# Patient Record
Sex: FEMALE
[De-identification: names, ages, dates, MRNs, and addresses within clinical notes are randomized per-mention and may not be internally consistent; named-entity substitution may affect disease eponyms.]

---

## 2017-04-13 ENCOUNTER — HOSPITAL ENCOUNTER (EMERGENCY)
Dept: HOSPITAL 42 - ED | Age: 62
Discharge: HOME | End: 2017-04-13
Payer: SELF-PAY

## 2017-04-13 VITALS — OXYGEN SATURATION: 95 % | DIASTOLIC BLOOD PRESSURE: 76 MMHG | HEART RATE: 95 BPM | SYSTOLIC BLOOD PRESSURE: 121 MMHG

## 2017-04-13 VITALS — TEMPERATURE: 97.7 F | RESPIRATION RATE: 18 BRPM

## 2017-04-13 VITALS — BODY MASS INDEX: 24.9 KG/M2

## 2017-04-13 DIAGNOSIS — F32.9: Primary | ICD-10-CM

## 2017-04-13 LAB
ADD MANUAL DIFF?: NO
ALBUMIN/GLOB SERPL: 1.1 {RATIO} (ref 1.1–1.8)
ALP SERPL-CCNC: 101 U/L (ref 38–133)
ALT SERPL-CCNC: 49 U/L (ref 7–56)
APPEARANCE UR: CLEAR
AST SERPL-CCNC: 59 U/L (ref 15–39)
BASOPHILS # BLD AUTO: 0.06 K/MM3 (ref 0–2)
BASOPHILS NFR BLD: 0.9 % (ref 0–3)
BILIRUB SERPL-MCNC: 1.7 MG/DL (ref 0.2–1.3)
BILIRUB UR-MCNC: NEGATIVE MG/DL
BUN SERPL-MCNC: 11 MG/DL (ref 7–21)
CALCIUM SERPL-MCNC: 8.8 MG/DL (ref 8.4–10.5)
CHLORIDE SERPL-SCNC: 102 MMOL/L (ref 95–110)
CO2 SERPL-SCNC: 31 MMOL/L (ref 21–33)
COLOR UR: YELLOW
EOSINOPHIL # BLD: 0.3 10*3/UL (ref 0–0.7)
EOSINOPHIL NFR BLD: 4.4 % (ref 1.5–5)
ERYTHROCYTE [DISTWIDTH] IN BLOOD BY AUTOMATED COUNT: 13.6 % (ref 11.5–14.5)
GLOBULIN SER-MCNC: 3.4 GM/DL
GLUCOSE SERPL-MCNC: 82 MG/DL (ref 70–110)
GLUCOSE UR STRIP-MCNC: NEGATIVE MG/DL
GRANULOCYTES # BLD: 3.09 10*3/UL (ref 1.4–6.5)
GRANULOCYTES NFR BLD: 48.6 % (ref 50–68)
HCT VFR BLD CALC: 39.8 % (ref 36–48)
KETONES UR STRIP-MCNC: NEGATIVE MG/DL
LEUKOCYTE ESTERASE UR-ACNC: NEGATIVE LEU/UL
LYMPHOCYTES # BLD: 2.4 10*3/UL (ref 1.2–3.4)
LYMPHOCYTES NFR BLD AUTO: 37 % (ref 22–35)
MCH RBC QN AUTO: 33.2 PG (ref 25–35)
MCHC RBC AUTO-ENTMCNC: 35.4 G/DL (ref 31–37)
MCV RBC AUTO: 93.6 FL (ref 80–105)
MONOCYTES # BLD AUTO: 0.6 10*3/UL (ref 0.1–0.6)
MONOCYTES NFR BLD: 9.1 % (ref 1–6)
PH UR STRIP: 7 [PH] (ref 4.7–8)
PLATELET # BLD: 160 10^3/UL (ref 120–450)
PMV BLD AUTO: 10.3 FL (ref 7–11)
POTASSIUM SERPL-SCNC: 3.9 MMOL/L (ref 3.6–5)
PROT SERPL-MCNC: 7.2 G/DL (ref 5.8–8.3)
PROT UR STRIP-MCNC: NEGATIVE MG/DL
RBC # UR STRIP: NEGATIVE /UL
SODIUM SERPL-SCNC: 139 MMOL/L (ref 132–148)
SP GR UR STRIP: 1.01 (ref 1–1.03)
UROBILINOGEN UR STRIP-ACNC: 4 E.U./DL
WBC # BLD AUTO: 6.4 10^3/UL (ref 4.5–11)

## 2017-04-13 PROCEDURE — 99284 EMERGENCY DEPT VISIT MOD MDM: CPT

## 2017-04-13 PROCEDURE — 85025 COMPLETE CBC W/AUTO DIFF WBC: CPT

## 2017-04-13 PROCEDURE — 71010: CPT

## 2017-04-13 PROCEDURE — 81003 URINALYSIS AUTO W/O SCOPE: CPT

## 2017-04-13 PROCEDURE — 80053 COMPREHEN METABOLIC PANEL: CPT

## 2017-04-13 PROCEDURE — 93005 ELECTROCARDIOGRAM TRACING: CPT

## 2017-04-13 PROCEDURE — 82948 REAGENT STRIP/BLOOD GLUCOSE: CPT

## 2017-04-13 NOTE — ED PDOC
Arrival/HPI





- General


Chief Complaint: Psychiatric Evaluation


Time Seen by Provider: 04/13/17 14:40


Historian: Patient, Spouse





- History of Present Illness


Narrative History of Present Illness (Text): 


04/13/17 15:21


61 year old female presents to the emergency department after telling her 

 she wants to hurt herself and him.  states she is on Xanax and 

Oxycodone. 


Time/Duration: 24 hours


Modifying Factors (Text): 


None 


Context: Home


Associated Symptoms (Text): 


None 





Past Medical History





- Provider Review


Nursing Documentation Reviewed: Yes





- Infectious Disease


Hx of Infectious Diseases: None





- Tetanus Immunization


Tetanus Immunization: Unknown





- Past Medical History


Past Medical History: Unable to Obtain





- Cardiac


Hx Cardiac Disorders: No


Hx Hypertension: No





- Pulmonary


Hx Tuberculosis: No





- Neurological


HX Cerebrovascular Accident: No


Hx Seizures: No





- HEENT


Hx HEENT Disorder: No





- Renal


Hx Renal Disorder: No





- Endocrine/Metabolic


Hx Endocrine Disorders: No





- Hematological/Oncological


Hx Blood Disorders: No


Hx Cancer: No





- Integumentary


Hx Dermatological Disorder: No





- Musculoskeletal/Rheumatological


Hx Musculoskeletal Disorders: Yes


Hx Back Pain: Yes


Hx Falls: Yes (When pt took too much drugs as per pt.)


Other/Comment: Sciatica





- Gastrointestinal


Hx Gastrointestinal Disorders: No





- Genitourinary/Gynecological


Hx Genitourinary Disorders: No


Hx Sexually Transmitted Diseases: No





- Psychiatric


Hx Psychophysiologic Disorder: No


Hx Substance Use: No (denies)





- Past Surgical History


Past Surgical History: Unable to Obtain





- Anesthesia


Hx Anesthesia: No





- Suicidal Assessment


Feels Threatened In Home Enviroment: No





Family/Social History





- Physician Review


Nursing Documentation Reviewed: Yes


Family/Social History: Unknown Family HX


Smoking Status: Heavy Smoker > 10 Cigarettes Daily


Hx Alcohol Use: No (denies)


Hx Substance Use: No (denies)





Allergies/Home Meds


Allergies/Adverse Reactions: 


Allergies





Penicillins Allergy (Verified 04/13/17 14:57)


 ANAPHYLAXIS








Home Medications: 


 Home Meds











 Medication  Instructions  Recorded  Confirmed


 


Alprazolam [Xanax] 2 mg PO PRN PRN 11/09/16 11/09/16


 


Oxycodone HCl [Oxycodone HCl ER] 30 mg PO Q12 11/09/16 11/09/16


 


Oxycodone HCl [Oxycontin] 30 mg PO PRN PRN 11/09/16 11/09/16














Review of Systems





- Physician Review


All systems were reviewed & negative as marked: Yes





- Review of Systems


Cardiovascular: absent: Chest Pain


Gastrointestinal: absent: Abdominal Pain





Physical Exam


Vital Signs Reviewed: Yes


Vital Signs











  Temp Pulse Resp BP Pulse Ox


 


 04/13/17 19:27   95 H  18  121/76  95


 


 04/13/17 17:56   65  18  105/63  93 L


 


 04/13/17 16:27   67  18  105/63  92 L


 


 04/13/17 14:53  97.7 F  73  18  126/70  98











Temperature: Afebrile


Blood Pressure: Normal


Pulse: Regular


Respiratory Rate: Normal


Appearance: Positive for: Well-Appearing, Non-Toxic, Comfortable


Pain Distress: None


Mental Status: Positive for: other (Awake, alert)


Finger Stick Blood Glucose: 73





- Systems Exam


Head: Present: Atraumatic, Normocephalic


Pupils: Present: PERRL


Extroacular Muscles: Present: EOMI


Conjunctiva: Present: Normal


Mouth: Present: Moist Mucous Membranes


Neck: Present: Normal Range of Motion


Respiratory/Chest: Present: Clear to Auscultation, Good Air Exchange.  No: 

Respiratory Distress, Accessory Muscle Use


Cardiovascular: Present: Regular Rate and Rhythm, Normal S1, S2.  No: Murmurs


Abdomen: Present: Normal Bowel Sounds.  No: Tenderness, Distention, Peritoneal 

Signs


Back: Present: Normal Inspection


Upper Extremity: Present: Normal Inspection.  No: Cyanosis, Edema


Lower Extremity: Present: Normal Inspection.  No: Edema


Neurological: Present: GCS=15, CN II-XII Intact, Speech Normal


Skin: Present: Warm, Dry, Normal Color.  No: Rashes


Psychiatric: Present: Alert, Oriented x 3, Normal Insight, Normal Concentration





Medical Decision Making


ED Course and Treatment: 


Impression: 61 year old female presents to the emergency department after 

telling her  she wants to hurt herself and him.





Differential Diagnosis include but are not limited to:  





Plan:


-- Chest X-ray


-- Labs


-- Reassess and disposition





Progress Notes: 





Patient medically clear, pending callback from collateral as per PES





04/13/17 21:44


cleared by crisis. pt demanding immediate dc





04/13/17 21:45


notifeid of mildly increased lfts, no abd ttp, advse outpt f/u pt verbalized 

understanding. 





- Lab Interpretations


Lab Results: 








 04/13/17 14:58 





 04/13/17 14:58 





 Lab Results





04/13/17 16:50: Urine Color Yellow, Urine Appearance Clear, Urine pH 7.0, Ur 

Specific Gravity 1.010, Urine Protein Negative, Urine Glucose (UA) Negative, 

Urine Ketones Negative, Urine Blood Negative, Urine Nitrate Negative, Urine 

Bilirubin Negative, Urine Urobilinogen 4.0 H, Ur Leukocyte Esterase Negative, 

Urine Opiates Screen Positive H, Urine Methadone Screen Negative, Ur 

Barbiturates Screen Negative, Ur Phencyclidine Scrn Negative, Ur Amphetamines 

Screen Negative, U Benzodiazepines Scrn Positive H, U Oth Cocaine Metabols 

Positive H, U Cannabinoids Screen Negative


04/13/17 15:05: Salicylates < 1 L


04/13/17 14:58: WBC 6.4, RBC 4.25, Hgb 14.1, Hct 39.8, MCV 93.6, MCH 33.2, MCHC 

35.4, RDW 13.6, Plt Count 160, MPV 10.3, Gran % 48.6 L, Lymph % (Auto) 37.0 H, 

Mono % (Auto) 9.1 H, Eos % (Auto) 4.4, Baso % (Auto) 0.9, Gran # 3.09, Lymph # 

2.4, Mono # 0.6, Eos # 0.3, Baso # 0.06, Sodium 139, Potassium 3.9, Chloride 102

, Carbon Dioxide 31, Anion Gap 10, BUN 11, Creatinine 0.7, Est GFR (African Amer

) > 60, Est GFR (Non-Af Amer) > 60, Random Glucose 82, Calcium 8.8, Total 

Bilirubin 1.7 H, AST 59 H, ALT 49, Alkaline Phosphatase 101, Total Protein 7.2, 

Albumin 3.7, Globulin 3.4, Albumin/Globulin Ratio 1.1, Acetaminophen < 10.0 L, 

Alcohol, Quantitative < 10











- RAD Interpretation


Radiology Orders: 








04/13/17 15:19


CXR [CHEST PORTABLE] [RAD] Stat 














- EKG Interpretation


EKG Interpretation (Text): 


EKG shows NSR at 70 BPM, no ST/T wave changes


Interpreted by ED Physician: Yes


Type: 12 lead EKG





- Scribe Statement


The provider has reviewed the documentation as recorded by the Gary Gant





Provider Scribe Attestation:


All medical record entries made by the Scribe were at my direction and 

personally dictated by me. I have reviewed the chart and agree that the record 

accurately reflects my personal performance of the history, physical exam, 

medical decision making, and the department course for this patient. I have 

also personally directed, reviewed, and agree with the discharge instructions 

and disposition. 





Disposition/Present on Arrival





- Present on Arrival


Any Indicators Present on Arrival: No


History of DVT/PE: No


History of Uncontrolled Diabetes: No


Urinary Catheter: No


History of Decub. Ulcer: No


History Surgical Site Infection Following: None





- Disposition


Have Diagnosis and Disposition been Completed?: Yes


Diagnosis: 


 Depression


Disposition: HOME/ ROUTINE


Disposition Time: 07:00


Condition: STABLE


Discharge Instructions (ExitCare):  Depression (DC), Suicide Prevention for 

Adults (GEN), Polysubstance Abuse (ED)


Additional Instructions: 


follow instructions by crisis worker, return to er with worsening symptoms or 

concerns. 


Referrals: 


Clement Read, [Primary Care Provider] - Follow up with primary

## 2017-04-13 NOTE — RAD
HISTORY:

pysch  



COMPARISON:

04/25/2016 



FINDINGS:



LUNGS:

No active pulmonary disease.



PLEURA:

No significant pleural effusion identified, no pneumothorax apparent.



CARDIOVASCULAR:

Normal.



OSSEOUS STRUCTURES:

No significant abnormalities.



VISUALIZED UPPER ABDOMEN:

Normal.



OTHER FINDINGS:

None.



IMPRESSION:

No active disease.

## 2017-04-14 NOTE — CARD
--------------- APPROVED REPORT --------------





EKG Measurement

Heart [id number]

[id number] [id number]

[id number]

[id number]

[id number]



<Conclusion>

Normal sinus rhythm

LAD

No change

## 2017-06-12 ENCOUNTER — HOSPITAL ENCOUNTER (OUTPATIENT)
Dept: HOSPITAL 42 - ED | Age: 62
Setting detail: OBSERVATION
LOS: 1 days | Discharge: HOME | End: 2017-06-13
Attending: EMERGENCY MEDICINE | Admitting: EMERGENCY MEDICINE
Payer: MEDICAID

## 2017-06-12 VITALS — HEART RATE: 61 BPM | TEMPERATURE: 97.5 F | RESPIRATION RATE: 19 BRPM | OXYGEN SATURATION: 97 %

## 2017-06-12 VITALS — DIASTOLIC BLOOD PRESSURE: 72 MMHG | SYSTOLIC BLOOD PRESSURE: 144 MMHG

## 2017-06-12 VITALS — BODY MASS INDEX: 24.9 KG/M2

## 2017-06-12 DIAGNOSIS — F10.129: Primary | ICD-10-CM

## 2017-06-12 PROCEDURE — 93971 EXTREMITY STUDY: CPT

## 2017-06-12 PROCEDURE — 82948 REAGENT STRIP/BLOOD GLUCOSE: CPT

## 2017-06-12 PROCEDURE — 99284 EMERGENCY DEPT VISIT MOD MDM: CPT

## 2017-06-12 NOTE — ED PDOC
Arrival/HPI





- General


Historian: Patient


EM Caveat: Intoxicated





<Paolo Barrera - Last Filed: 06/12/17 18:58>





<Oscar Colin - Last Filed: 06/13/17 02:11>





- General


Chief Complaint: Alcohol Ingestion


Time Seen by Provider: 06/12/17 16:15





- History of Present Illness


Narrative History of Present Illness (Text): 


06/12/17 16:15


Patient presents with slurring of speech and alcohol on breath. Admits to 

drinking throughout the day. Pt denies suicidal or homicidal ideations. Denies 

any trauma or injury. 


 (Paolo Barrera)





Past Medical History





- Provider Review


Nursing Documentation Reviewed: Yes





- Infectious Disease


Hx of Infectious Diseases: None





- Tetanus Immunization


Tetanus Immunization: Unknown





- Past Medical History


Past Medical History: Unable to Obtain





- Cardiac


Hx Cardiac Disorders: No


Hx Hypertension: No





- Pulmonary


Hx Tuberculosis: No





- Neurological


HX Cerebrovascular Accident: No


Hx Seizures: No





- HEENT


Hx HEENT Disorder: No





- Renal


Hx Renal Disorder: No





- Endocrine/Metabolic


Hx Endocrine Disorders: No





- Hematological/Oncological


Hx Blood Disorders: No


Hx Cancer: No





- Integumentary


Hx Dermatological Disorder: No





- Musculoskeletal/Rheumatological


Hx Musculoskeletal Disorders: Yes


Hx Back Pain: Yes


Hx Falls: Yes (When pt took too much drugs as per pt.)


Other/Comment: Sciatica





- Gastrointestinal


Hx Gastrointestinal Disorders: No





- Genitourinary/Gynecological


Hx Genitourinary Disorders: No


Hx Sexually Transmitted Diseases: No





- Psychiatric


Hx Psychophysiologic Disorder: No


Hx Substance Use: No (denies)





- Past Surgical History


Past Surgical History: Unable to Obtain





- Anesthesia


Hx Anesthesia: No





- Suicidal Assessment


Feels Threatened In Home Enviroment: No





<Paolo Barrera - Last Filed: 06/12/17 18:58>





Family/Social History





- Physician Review


Nursing Documentation Reviewed: Yes


Family/Social History: Unknown Family HX


Smoking Status: Heavy Smoker > 10 Cigarettes Daily


Hx Alcohol Use: No (denies)


Hx Substance Use: No (denies)





<Paolo Barrera - Last Filed: 06/12/17 18:58>





Allergies/Home Meds





<Paolo Barrera - Last Filed: 06/12/17 18:58>





<Oscar Colin - Last Filed: 06/13/17 02:11>


Allergies/Adverse Reactions: 


Allergies





Penicillins Allergy (Verified 06/12/17 15:34)


 ANAPHYLAXIS








Home Medications: 


 Home Meds











 Medication  Instructions  Recorded  Confirmed


 


Alprazolam [Xanax] 2 mg PO PRN PRN 11/09/16 11/09/16


 


Oxycodone HCl [Oxycodone HCl ER] 30 mg PO Q12 11/09/16 11/09/16


 


Oxycodone HCl [Oxycontin] 30 mg PO PRN PRN 11/09/16 11/09/16














Review of Systems





- Review of Systems


Systems not reviewed;Unavailable: Intoxicated





<AndresksonPaolo - Last Filed: 06/12/17 18:58>





Physical Exam


Vital Signs Reviewed: Yes


Temperature: Afebrile


Blood Pressure: Normal


Pulse: Regular


Respiratory Rate: Normal


Appearance: Positive for: Well-Appearing, Non-Toxic, Comfortable


Pain Distress: None


Mental Status: Positive for: Alert and Oriented X 3





<NighatonPaolo - Last Filed: 06/12/17 18:58>





<KendralauritaOscar - Last Filed: 06/13/17 02:11>





- Physical Exam


Narrative Physical Exam (Text): 





Patient is in no distress, no airway compromise, breathing without difficulty, 

good insp/exp effort. No signs of head/torso/extremity trauma. Following 

commands without difficulty. 





Head: Present: Atraumatic, Normocephalic.  No: Tenderness, Contusion, Swelling, 

Ecchymosis, Abrasion, Laceration


Pupils: Present: PERRL


Extroacular Muscles: Present: EOMI


Conjunctiva: Present: Normal


Mouth: Present: Moist Mucous Membranes


Neck: Present: Normal Range of Motion.  No: MIDLINE TENDERNESS, Paraspinal 

Tenderness


Respiratory/Chest: Present: Clear to Auscultation, Good Air Exchange.  No: 

Respiratory Distress, Accessory Muscle Use


Cardiovascular: Present: Regular Rate and Rhythm, Normal S1, S2.  No: Murmurs


Abdomen: Present: Normal Bowel Sounds.  No: Tenderness, Distention, Peritoneal 

Signs, Rebound, Guarding


Back: Present: Normal Inspection.  No: Midline Tenderness, Paraspinal Tenderness


Upper Extremity: Present: Normal Inspection.  No: Cyanosis, Edema


Lower Extremity: Present: Normal Inspection.  No: Edema


Neurological: Present: GCS=15, CN II-XII Intact


Skin: Present: Warm, Dry, Normal Color.  No: Rashes


Lymphatic: Present: OX3, NI, NC


Psychiatric: Present: Alert.  Absent: Agitated, suicidal/homicidal ideations


 (Paolo Barrera)


Vital Signs











  Temp Pulse Resp BP Pulse Ox


 


 06/12/17 23:01  97.5 F L  61  19  144/72  97


 


 06/12/17 21:00   70  20  144/72  95


 


 06/12/17 18:04   66  20  103/72  93 L


 


 06/12/17 16:21  98.2 F  82  18  100/63  92 L


 


 06/12/17 15:31  98.8 F  81  18  111/77  95














Medical Decision Making





<Paolo Barrera - Last Filed: 06/12/17 18:58>


Re-evaluation Time: 01:58


Reassessment Condition: Re-examined, Improved





<Oscar Colin - Last Filed: 06/13/17 02:11>


ED Course and Treatment: 


06/12/17 16:15


Impression:


A 61 year old female with alcohol intoxication. 





Differential Diagnosis include but are not limited to:  intoxication





Plan:


-- sobriety


-- Reassess and disposition


 (Paolo Barrera)





ED OBSERVATION


Date of observation admission: 06/12/17


Time of observation admission: 16:15





<Paolo Barrera - Last Filed: 06/12/17 18:58>


Discharge: Yes





<Oscar Colin - Last Filed: 06/13/17 02:11>





- Observation admission statement


Patient is being placed in observation because:: 


alcohol intoxication (Paolo Barrera)





- Goals of Observation


Goals of observation are:: 


sobriety (Paolo Barrera)





- Progress Note


Progress Note: 





06/12/17 19:00


pt sleeping, in no distress, protecting her airway


signed out to Dr. Colin in stable condition, pending monitoring, reeval, dispo


 (Paolo Barrera)





06/12/17 21:00


Patient signed out to me by Dr. Barrera. Patient resting comfortably with 

stable vitals. 





06/12/17 23:00


Patient resting comfortably with stable vitals. No new complaints 





06/13/17 02:09


Patient is awake, alert oriented X3 and is able to ambulate with steady gait. 

Patient is stable for discharge. Will discharge patient home. 





 (Oscar Colin)





- Scribe Statement


The provider has reviewed the documentation as recorded by the Scribe





<Paolo Barrera - Last Filed: 06/12/17 18:58>





<Oscar Colin - Last Filed: 06/13/17 02:11>





- Scribe Statement





Conchita Patterson





Provider Scribe Attestation:


All medical record entries made by the Scribe were at my direction and 

personally dictated by me. I have reviewed the chart and agree that the record 

accurately reflects my personal performance of the history, physical exam, 

medical decision making, and the department course for this patient. I have 

also personally directed, reviewed, and agree with the discharge instructions 

and disposition.





 (Paolo Barrera)





Disposition/Present on Arrival





- Present on Arrival


Any Indicators Present on Arrival: No


History of DVT/PE: No


History of Uncontrolled Diabetes: No


Urinary Catheter: No


History of Decub. Ulcer: No


History Surgical Site Infection Following: None





- Disposition


Have Diagnosis and Disposition been Completed?: Yes


Disposition Time: 16:20


Patient Plan: Observation





<Paolo Barrera - Last Filed: 06/12/17 18:58>





- Present on Arrival


Any Indicators Present on Arrival: No





- Disposition


Have Diagnosis and Disposition been Completed?: Yes


Disposition Time: 01:59





<Oscar Colin - Last Filed: 06/13/17 02:11>





- Disposition


Diagnosis: 


 Alcohol intoxication





Disposition: HOME/ ROUTINE


Condition: STABLE

## 2017-06-13 NOTE — US
PROCEDURE:  Right lower extremity venous US 



HISTORY:

Leg pain and swelling. Evaluate for DVT.



PHYSICIAN(S):  Slick Pablo M.D.



TECHNIQUE:

Duplex sonography and color-flow Doppler with graded compression were 

used to evaluate the deep venous system of the right lower extremity. 



FINDINGS:

The visualized deep venous system of the right lower extremity is 

sonographically normal and compressible. Normal waveforms and 

augmentation are seen. There is no sonographic evidence for deep 

venous thrombosis in the visualized segments of the right lower 

extremity.



There is a 2.8 x 3.7 cm fluid collection in the right popliteal fossa,

 consistent with a Baker's cyst. 



IMPRESSION:

1. No sonographic evidence for deep venous thrombosis in the 

visualized segments of the right lower extremity.

## 2017-11-27 ENCOUNTER — HOSPITAL ENCOUNTER (EMERGENCY)
Dept: HOSPITAL 42 - ED | Age: 62
Discharge: HOME | End: 2017-11-27
Payer: MEDICAID

## 2017-11-27 VITALS
HEART RATE: 64 BPM | SYSTOLIC BLOOD PRESSURE: 124 MMHG | RESPIRATION RATE: 19 BRPM | OXYGEN SATURATION: 97 % | DIASTOLIC BLOOD PRESSURE: 67 MMHG

## 2017-11-27 VITALS — BODY MASS INDEX: 24.9 KG/M2

## 2017-11-27 VITALS — TEMPERATURE: 97.7 F

## 2017-11-27 DIAGNOSIS — F11.20: Primary | ICD-10-CM

## 2017-11-27 DIAGNOSIS — Z88.0: ICD-10-CM

## 2017-11-27 DIAGNOSIS — F17.210: ICD-10-CM

## 2017-11-27 LAB
ALBUMIN/GLOB SERPL: 1.2 {RATIO} (ref 1.1–1.8)
ALP SERPL-CCNC: 80 U/L (ref 38–126)
ALT SERPL-CCNC: 69 U/L (ref 7–56)
APPEARANCE UR: (no result)
APTT BLD: 29.3 SECONDS (ref 25.1–36.5)
AST SERPL-CCNC: 77 U/L (ref 14–36)
BACTERIA #/AREA URNS HPF: (no result) /[HPF]
BASOPHILS # BLD AUTO: 0.06 K/MM3 (ref 0–2)
BASOPHILS NFR BLD: 0.8 % (ref 0–3)
BILIRUB SERPL-MCNC: 0.6 MG/DL (ref 0.2–1.3)
BILIRUB UR-MCNC: NEGATIVE MG/DL
BUN SERPL-MCNC: 17 MG/DL (ref 7–21)
CALCIUM SERPL-MCNC: 9.5 MG/DL (ref 8.4–10.5)
CHLORIDE SERPL-SCNC: 106 MMOL/L (ref 98–107)
CO2 SERPL-SCNC: 31 MMOL/L (ref 21–33)
COLOR UR: YELLOW
EOSINOPHIL # BLD: 0.3 10*3/UL (ref 0–0.7)
EOSINOPHIL NFR BLD: 3.5 % (ref 1.5–5)
ERYTHROCYTE [DISTWIDTH] IN BLOOD BY AUTOMATED COUNT: 13.3 % (ref 11.5–14.5)
ETHANOL SERPL-MCNC: < 10 MG/DL (ref 0–10)
GLOBULIN SER-MCNC: 3.4 GM/DL
GLUCOSE SERPL-MCNC: 85 MG/DL (ref 70–110)
GLUCOSE UR STRIP-MCNC: NEGATIVE MG/DL
GRANULOCYTES # BLD: 4.78 10*3/UL (ref 1.4–6.5)
GRANULOCYTES NFR BLD: 66.3 % (ref 50–68)
HCT VFR BLD CALC: 45.7 % (ref 36–48)
INR PPP: 1.03 (ref 0.93–1.08)
KETONES UR STRIP-MCNC: NEGATIVE MG/DL
LEUKOCYTE ESTERASE UR-ACNC: (no result) LEU/UL
LYMPHOCYTES # BLD: 1.6 10*3/UL (ref 1.2–3.4)
LYMPHOCYTES NFR BLD AUTO: 22.5 % (ref 22–35)
MAGNESIUM SERPL-MCNC: 1.8 MG/DL (ref 1.7–2.2)
MCH RBC QN AUTO: 33.8 PG (ref 25–35)
MCHC RBC AUTO-ENTMCNC: 34.8 G/DL (ref 31–37)
MCV RBC AUTO: 97.2 FL (ref 80–105)
MONOCYTES # BLD AUTO: 0.5 10*3/UL (ref 0.1–0.6)
MONOCYTES NFR BLD: 6.9 % (ref 1–6)
PH UR STRIP: 6 [PH] (ref 4.7–8)
PHOSPHATE SERPL-MCNC: 4.3 MG/DL (ref 2.5–4.5)
PLATELET # BLD: 163 10^3/UL (ref 120–450)
PMV BLD AUTO: 10.8 FL (ref 7–11)
POTASSIUM SERPL-SCNC: 3.5 MMOL/L (ref 3.6–5)
PROT SERPL-MCNC: 7.4 G/DL (ref 5.8–8.3)
PROT UR STRIP-MCNC: (no result) MG/DL
RBC # UR STRIP: (no result) /UL
SODIUM SERPL-SCNC: 143 MMOL/L (ref 132–148)
SP GR UR STRIP: >= 1.03 (ref 1–1.03)
TROPONIN I SERPL-MCNC: < 0.01 NG/ML
TSH SERPL-ACNC: 1.13 MIU/ML (ref 0.46–4.68)
UROBILINOGEN UR STRIP-ACNC: 0.2 E.U./DL
WBC # BLD AUTO: 7.2 10^3/UL (ref 4.5–11)
WBC #/AREA URNS HPF: (no result) /HPF (ref 0–6)

## 2017-11-27 NOTE — ED PDOC
Arrival/HPI





- General


Chief Complaint: Psychiatric Evaluation


Time Seen by Provider: 11/27/17 06:17


Historian: Patient, EMS





- History of Present Illness


Narrative History of Present Illness (Text): 





11/27/17 07:10


Jozef Ahumada is a 62 year old female, whose past social history includes 

polysubstance abuse, who is brought via EMS after being found trying to climb 

over fences into someones backyard.  Patient has had prior admission for AMS. 

Patient has tangenitial thoughts and first is requesting cherry soda, then 

reports that she is looking for her keys, then reporting she was at home 

sleeping.  She denies any pain but ROS limited by AMS.  She denies any 

complaints and reports that she wants to go home.  





Time/Duration: Prior to Arrival


Symptom Onset: Sudden


Symptom Course: Unchanged





Past Medical History





- Provider Review


Nursing Documentation Reviewed: Yes





- Infectious Disease


Hx of Infectious Diseases: None





- Tetanus Immunization


Tetanus Immunization: Unknown





- Past Medical History


Past Medical History: Unable to Obtain





- Cardiac


Hx Cardiac Disorders: No


Hx Hypertension: No





- Pulmonary


Hx Tuberculosis: No





- Neurological


HX Cerebrovascular Accident: No


Hx Seizures: No





- HEENT


Hx HEENT Disorder: No





- Renal


Hx Renal Disorder: No





- Endocrine/Metabolic


Hx Endocrine Disorders: No





- Hematological/Oncological


Hx Blood Disorders: No


Hx Cancer: No





- Integumentary


Hx Dermatological Disorder: No





- Musculoskeletal/Rheumatological


Hx Musculoskeletal Disorders: Yes


Hx Back Pain: Yes


Hx Falls: Yes (When pt took too much drugs as per pt.)


Other/Comment: Sciatica





- Gastrointestinal


Hx Gastrointestinal Disorders: No





- Genitourinary/Gynecological


Hx Genitourinary Disorders: No


Hx Sexually Transmitted Diseases: No





- Psychiatric


Hx Psychophysiologic Disorder: No


Hx Substance Use: No (denies)





- Past Surgical History


Past Surgical History: Unable to Obtain





- Anesthesia


Hx Anesthesia: No





- Suicidal Assessment


Feels Threatened In Home Enviroment: No





Family/Social History





- Physician Review


Nursing Documentation Reviewed: Yes


Family/Social History: Unknown Family HX


Smoking Status: Heavy Smoker > 10 Cigarettes Daily


Hx Alcohol Use: No (denies)


Hx Substance Use: No (denies)





Allergies/Home Meds


Allergies/Adverse Reactions: 


Allergies





Penicillins Allergy (Verified 06/12/17 15:34)


 ANAPHYLAXIS








Home Medications: 


 Home Meds











 Medication  Instructions  Recorded  Confirmed


 


Unobtainable  11/27/17 11/27/17














Review of Systems





- Review of Systems


Systems not reviewed;Unavailable: Altered Mental Status


Respiratory: absent: SOB


Cardiovascular: absent: Chest Pain


Gastrointestinal: absent: Abdominal Pain, Diarrhea, Nausea, Vomiting


Neurological: absent: Headache, Dizziness


Endocrine: absent: Polyuria





Physical Exam


Vital Signs Reviewed: Yes


Vital Signs











  Temp Pulse Resp BP Pulse Ox


 


 11/27/17 08:28   64  19  124/67  97


 


 11/27/17 06:41  97.7 F  69  17  107/71  98











Temperature: Afebrile


Blood Pressure: Normal


Pulse: Regular


Respiratory Rate: Normal


Appearance: Positive for: Comfortable, Unkept


Pain Distress: None


Mental Status: Positive for: Alert and Oriented X 3





- Systems Exam


Head: Present: Atraumatic, Normocephalic


Pupils: Present: PERRL


Extroacular Muscles: Present: EOMI


Conjunctiva: Present: Normal


Mouth: Present: Moist Mucous Membranes


Neck: Present: Normal Range of Motion.  No: MIDLINE TENDERNESS, Paraspinal 

Tenderness, Trachea Midline


Respiratory/Chest: Present: Clear to Auscultation, Good Air Exchange.  No: 

Respiratory Distress, Accessory Muscle Use, Wheezes, Rales, Retracting, Rhonchi


Cardiovascular: Present: Regular Rate and Rhythm, Normal S1, S2.  No: Murmurs


Abdomen: Present: Normal Bowel Sounds.  No: Tenderness, Distention, Peritoneal 

Signs, Rebound, Guarding


Upper Extremity: Present: Normal Inspection, Normal ROM, NORMAL PULSES, 

Neurovascularly Intact, Capillary Refill < 2s.  No: Cyanosis, Edema, Tenderness

, Swelling, Erythema, Deformity


Lower Extremity: Present: Normal Inspection, NORMAL PULSES, Normal ROM, 

Capillary Refill < 2 s.  No: Edema, CALF TENDERNESS, Tenderness, Swelling, 

Erythema, Deformity


Neurological: Present: GCS=15, CN II-XII Intact, Speech Normal


Skin: Present: Warm, Dry, Normal Color.  No: Rashes


Psychiatric: Present: Alert, Other (oriented x 1, tangential thoughts)





Medical Decision Making


ED Course and Treatment: 





11/27/17 


Impression:


62 year old female brought in via EMS with prior admissions for AMS and social 

history of polysubstance abuse. 





Plan:


-- CT Head without contrast


-- EKG


-- Chest X-ray


-- Labs


-- Urinalysis 


-- Reassess and disposition





Progress Notes:


 [id number]





11/27/17 08:30


Patient is ambulating around emergency department and wants go home. She 

reports she was climbing a fence prior to arrival trying to get her keys.  Her 

test results are significant for polysubstance.  She knows her address and 

reports that she will walk home.  She is now AAox3 and has no complaints.  She 

is refusing additional testing and wants to leave.





11/27/17 08:30


EKG:


Ordered, reviewed, and independently interpreted the EKG.


Rate : 63 BPM


Rhythm : NSR


Interpretation : No ST-segment elevations. LAD











- Lab Interpretations


Lab Results: 








 11/27/17 07:27 





 11/27/17 07:27 





 Lab Results





11/27/17 08:20: Urine Opiates Screen Positive H, Urine Methadone Screen Negative

, Ur Barbiturates Screen Negative, Ur Phencyclidine Scrn Negative, Ur 

Amphetamines Screen Negative, U Benzodiazepines Scrn Positive, U Oth Cocaine 

Metabols Positive H, U Cannabinoids Screen Negative


11/27/17 08:20: Urine Color Yellow, Urine Appearance Sl cloudy, Urine pH 6.0, 

Ur Specific Gravity >= 1.030, Urine Protein Trace H, Urine Glucose (UA) Negative

, Urine Ketones Negative, Urine Blood Small H, Urine Nitrate Negative, Urine 

Bilirubin Negative, Urine Urobilinogen 0.2, Ur Leukocyte Esterase Moderate H, 

Urine RBC 1 - 3, Urine WBC 20 - 25, Ur Epithelial Cells 1 - 3, Urine Bacteria 

Many


11/27/17 08:11: PT 11.3, INR 1.03, APTT 29.3


11/27/17 07:45: Salicylates < 1 L, Acetaminophen < 10.0 L


11/27/17 07:45: TSH 3rd Generation 1.13, Alcohol, Quantitative < 10


11/27/17 07:27: Sodium 143, Potassium 3.5 L, Chloride 106, Carbon Dioxide 31, 

Anion Gap 10, BUN 17, Creatinine 0.8, Est GFR (African Amer) > 60, Est GFR (Non-

Af Amer) > 60, Random Glucose 85, Calcium 9.5, Phosphorus 4.3, Magnesium 1.8, 

Total Bilirubin 0.6, AST 77 H, ALT 69 H, Alkaline Phosphatase 80, Lactate 

Dehydrogenase 586, Total Creatine Kinase 729 H, CK-MB (CK-2) 23.6 H, CK-MB (CK-2

) % 3.2 H, Troponin I < 0.01, Total Protein 7.4, Albumin 4.0, Globulin 3.4, 

Albumin/Globulin Ratio 1.2


11/27/17 07:27: WBC 7.2, RBC 4.70, Hgb 15.9, Hct 45.7, MCV 97.2, MCH 33.8, MCHC 

34.8, RDW 13.3, Plt Count 163, MPV 10.8, Gran % 66.3, Lymph % (Auto) 22.5, Mono 

% (Auto) 6.9 H, Eos % (Auto) 3.5, Baso % (Auto) 0.8, Gran # 4.78, Lymph # 1.6, 

Mono # 0.5, Eos # 0.3, Baso # 0.06


11/27/17 07:17: POC Glucose (mg/dL) 129 H








I have reviewed the lab results: Yes





- RAD Interpretation


: Radiologist





- EKG Interpretation


Interpreted by ED Physician: Yes


Type: 12 lead EKG





- Scribe Statement


The provider has reviewed the documentation as recorded by the Gary Flores





Provider Scribe Attestation:


All medical record entries made by the Heenae were at my direction and 

personally dictated by me. I have reviewed the chart and agree that the record 

accurately reflects my personal performance of the history, physical exam, 

medical decision making, and the department course for this patient. I have 

also personally directed, reviewed, and agree with the discharge instructions 

and disposition. 





Disposition/Present on Arrival





- Present on Arrival


Any Indicators Present on Arrival: No


History of DVT/PE: No


History of Uncontrolled Diabetes: No


Urinary Catheter: No


History of Decub. Ulcer: No


History Surgical Site Infection Following: None





- Disposition


Have Diagnosis and Disposition been Completed?: Yes


Diagnosis: 


 Opioid use disorder, severe, dependence





Disposition: HOME/ ROUTINE


Disposition Time: 08:27


Patient Plan: Discharge


Condition: GOOD


Discharge Instructions (ExitCare):  Fall Prevention for Older Adults (ED)


Additional Instructions: 


Return to Emergency department if you want further care.  Follow-up with PMD 

within 2 days. 


Referrals: 


PCP,NO [Primary Care Provider] - Follow up with primary


Forms:  [employer] (English)

## 2017-11-27 NOTE — CARD
--------------- APPROVED REPORT --------------





EKG Measurement

Heart [id number]

ME [id number]

[id number]

[id number]

[id number]



<Conclusion>

Normal sinus rhythm

Left axis deviation

Cannot rule out Anterior infarct, age undetermined

Abnormal ECG

## 2017-11-29 ENCOUNTER — HOSPITAL ENCOUNTER (INPATIENT)
Dept: HOSPITAL 31 - C.ER | Age: 62
Discharge: LEFT BEFORE DISCHARGE | DRG: 743 | End: 2017-11-29
Attending: PSYCHIATRY & NEUROLOGY | Admitting: PSYCHIATRY & NEUROLOGY
Payer: MEDICAID

## 2017-11-29 VITALS
RESPIRATION RATE: 18 BRPM | DIASTOLIC BLOOD PRESSURE: 71 MMHG | HEART RATE: 73 BPM | OXYGEN SATURATION: 95 % | TEMPERATURE: 97.6 F | SYSTOLIC BLOOD PRESSURE: 119 MMHG

## 2017-11-29 VITALS — BODY MASS INDEX: 24.9 KG/M2

## 2017-11-29 DIAGNOSIS — F13.20: ICD-10-CM

## 2017-11-29 DIAGNOSIS — F11.20: Primary | ICD-10-CM

## 2017-11-29 DIAGNOSIS — F14.20: ICD-10-CM

## 2017-11-29 DIAGNOSIS — N39.0: ICD-10-CM

## 2017-11-29 LAB
ALBUMIN/GLOB SERPL: 0.9 {RATIO} (ref 1–2.1)
ALP SERPL-CCNC: 71 U/L (ref 38–126)
ALT SERPL-CCNC: 61 U/L (ref 9–52)
AST SERPL-CCNC: 65 U/L (ref 14–36)
BACTERIA #/AREA URNS HPF: (no result) /[HPF]
BASOPHILS # BLD AUTO: 0.1 K/UL (ref 0–0.2)
BASOPHILS NFR BLD: 1.4 % (ref 0–2)
BILIRUB SERPL-MCNC: 1 MG/DL (ref 0.2–1.3)
BILIRUB UR-MCNC: NEGATIVE MG/DL
BUN SERPL-MCNC: 10 MG/DL (ref 7–17)
CALCIUM SERPL-MCNC: 8.3 MG/DL (ref 8.6–10.4)
CHLORIDE SERPL-SCNC: 105 MMOL/L (ref 98–107)
CO2 SERPL-SCNC: 22 MMOL/L (ref 22–30)
EOSINOPHIL # BLD AUTO: 0.1 K/UL (ref 0–0.7)
EOSINOPHIL NFR BLD: 1.8 % (ref 0–4)
ERYTHROCYTE [DISTWIDTH] IN BLOOD BY AUTOMATED COUNT: 13.6 % (ref 11.5–14.5)
ETHANOL SERPL-MCNC: 39 MG/DL (ref 0–10)
GLOBULIN SER-MCNC: 4.4 GM/DL (ref 2.2–3.9)
GLUCOSE SERPL-MCNC: 76 MG/DL (ref 65–105)
GLUCOSE UR STRIP-MCNC: NORMAL MG/DL
HCT VFR BLD CALC: 44.4 % (ref 34–47)
KETONES UR STRIP-MCNC: NEGATIVE MG/DL
LEUKOCYTE ESTERASE UR-ACNC: (no result) LEU/UL
LYMPHOCYTES # BLD AUTO: 2.5 K/UL (ref 1–4.3)
LYMPHOCYTES NFR BLD AUTO: 30.3 % (ref 20–40)
MCH RBC QN AUTO: 33.4 PG (ref 27–31)
MCHC RBC AUTO-ENTMCNC: 34.5 G/DL (ref 33–37)
MCV RBC AUTO: 96.7 FL (ref 81–99)
MONOCYTES # BLD: 0.6 K/UL (ref 0–0.8)
MONOCYTES NFR BLD: 7.5 % (ref 0–10)
NRBC BLD AUTO-RTO: 0.1 % (ref 0–2)
PH UR STRIP: 5 [PH] (ref 5–8)
PLATELET # BLD: 180 K/UL (ref 130–400)
PMV BLD AUTO: 8.7 FL (ref 7.2–11.7)
POTASSIUM SERPL-SCNC: 3.6 MMOL/L (ref 3.6–5.2)
PROT SERPL-MCNC: 8.4 G/DL (ref 6.3–8.3)
PROT UR STRIP-MCNC: (no result) MG/DL
RBC # UR STRIP: (no result) /UL
RBC #/AREA URNS HPF: 5 /HPF (ref 0–3)
SODIUM SERPL-SCNC: 131 MMOL/L (ref 132–148)
SP GR UR STRIP: 1.03 (ref 1–1.03)
TRANS CELLS #/AREA URNS HPF: < 1 /HPF (ref 0–3)
UROBILINOGEN UR-MCNC: 4 MG/DL (ref 0.2–1)
WBC # BLD AUTO: 8.1 K/UL (ref 4.8–10.8)
WBC #/AREA URNS HPF: 37 /HPF (ref 0–5)

## 2017-11-29 NOTE — C.PDOC
History Of Present Illness


61 y/o female, pre-screened, presents to ED requesting detox from heroin, 

cocaine, Xanax, Ativan, and Percocet. Last use was yesterday. Pt reports 

episode of incontinence this morning. Pt also complaints of abdominal discomfort

, and shaking and cramping in her legs. Denies n/v/d, fever, back pain, SI, HI, 

or any other associated symptoms at this time.





Time Seen by Provider: 11/29/17 18:02


Chief Complaint (Nursing): Substance Abuse


History Per: Patient


History/Exam Limitations: no limitations


Onset/Duration Of Symptoms: Days


Current Symptoms Are (Timing): Still Present


Suicide/Self Injury Attempted (Context): None


Associated Symptoms: denies: Suicidal Thoughts, Suicidal Plan


Involuntary Hold By: None


Recent travel outside of the [place]: No


Additional History Per: Patient





Past Medical History


Reviewed: Historical Data, Nursing Documentation, Vital Signs


Vital Signs: 


 Last Vital Signs











Temp  97.8 F   11/29/17 17:59


 


Pulse  74   11/29/17 17:59


 


Resp  18   11/29/17 17:59


 


BP  122/73   11/29/17 17:59


 


Pulse Ox  94 L  11/29/17 18:18














- Medical History


PMH: 


   Denies: Diabetes, Hepatitis, HIV, HTN, Chronic Kidney Disease, Seizures, 

Sexually Transmitted Disease





- [facility] Procedures








APPLICATION OF SPLINT (09/11/05)


DETOXIFICATION SERVICES FOR SUBSTANCE ABUSE TREATMENT (06/13/16)


GROUP PSYCHOTHERAPY (06/13/16)


INJECT/INFUSE NEC (01/15/07)








Family History: States: Unknown Family Hx





- Social History


Hx Alcohol Use: Yes


Hx Substance Use: Yes





- Immunization History


Hx Tetanus Toxoid Vaccination: No


Hx Influenza Vaccination: No


Hx Pneumococcal Vaccination: No





Review Of Systems


Except As Marked, All Systems Reviewed And Found Negative.


Constitutional: Negative for: Fever, Chills


Cardiovascular: Negative for: Chest Pain


Respiratory: Negative for: Shortness of Breath


Gastrointestinal: Positive for: Abdominal Pain.  Negative for: Nausea, Vomiting

, Diarrhea, Constipation


Genitourinary: Positive for: Incontinence.  Negative for: Dysuria, Frequency, 

Hematuria, Vaginal Discharge


Musculoskeletal: Positive for: Leg Pain


Neurological: Negative for: Weakness, Numbness, Headache, Dizziness


Psych: Negative for: Suicidal ideation





Physical Exam





- Physical Exam


Appears: Non-toxic, No Acute Distress


Skin: Normal Color, Warm, Dry


Head: Atraumatic, Normacephalic


Eye(s): bilateral: Normal Inspection


Oral Mucosa: Moist


Neck: Normal ROM, Supple


Chest: Symmetrical


Cardiovascular: Rhythm Regular, No Murmur


Respiratory: Normal Breath Sounds, No Rales, No Rhonchi, No Wheezing


Gastrointestinal/Abdominal: Soft, No Tenderness


Back: No CVA Tenderness


Extremity: Normal ROM


Neurological/Psych: Oriented x3, Normal Speech





ED Course And Treatment





- Laboratory Results


Result Diagrams: 


 11/29/17 18:41





 11/29/17 18:41


Lab Interpretation: Abnormal


Interpretation Of Abnormal: Urine + for WBC, 2+leukocyte esterase


O2 Sat by Pulse Oximetry: 94


Pulse Ox Interpretation: Normal


Progress Note: Blood work, UA ordered and reviewed.  Patient is medically 

cleared for detox admission. she does have a UTI that will need further 

treatment.


Reevaluation Time: 19:38


Reassessment Condition: Improved





Disposition





- Disposition


Disposition: HOSPITALIZED


Disposition Time: 19:39


Condition: STABLE


Forms:  [facility] (English)





- Clinical Impression


Clinical Impression: 


 Polysubstance abuse, UTI (urinary tract infection)








- Scribe Statement


The provider has reviewed the documentation as recorded by the Scribe


Evelyne Gamboa





All medical record entries made by the Scribe were at my direction and 

personally dictated by me. I have reviewed the chart and agree that the record 

accurately reflects my personal performance of the history, physical exam, 

medical decision making, and the department course for this patient. I have 

also personally directed, reviewed, and agree with the discharge instructions 

and disposition.

## 2017-12-25 ENCOUNTER — HOSPITAL ENCOUNTER (EMERGENCY)
Dept: HOSPITAL 42 - ED | Age: 62
LOS: 1 days | Discharge: LEFT BEFORE DISCHARGE | End: 2017-12-26
Payer: MEDICAID

## 2017-12-25 VITALS — BODY MASS INDEX: 25.4 KG/M2

## 2017-12-25 VITALS — TEMPERATURE: 98.1 F

## 2017-12-25 DIAGNOSIS — F19.10: ICD-10-CM

## 2017-12-25 DIAGNOSIS — Z88.0: ICD-10-CM

## 2017-12-25 DIAGNOSIS — D72.829: Primary | ICD-10-CM

## 2017-12-25 DIAGNOSIS — F17.210: ICD-10-CM

## 2017-12-25 LAB
ALBUMIN/GLOB SERPL: 1.2 {RATIO} (ref 1.1–1.8)
ALP SERPL-CCNC: 81 U/L (ref 38–126)
ALT SERPL-CCNC: 41 U/L (ref 7–56)
AST SERPL-CCNC: 38 U/L (ref 14–36)
BASOPHILS # BLD AUTO: 0.04 K/MM3 (ref 0–2)
BASOPHILS NFR BLD: 0.3 % (ref 0–3)
BILIRUB SERPL-MCNC: 1 MG/DL (ref 0.2–1.3)
BUN SERPL-MCNC: 9 MG/DL (ref 7–21)
CALCIUM SERPL-MCNC: 9 MG/DL (ref 8.4–10.5)
CHLORIDE SERPL-SCNC: 102 MMOL/L (ref 98–107)
CO2 SERPL-SCNC: 28 MMOL/L (ref 21–33)
EOSINOPHIL # BLD: 0.1 10*3/UL (ref 0–0.7)
EOSINOPHIL NFR BLD: 0.3 % (ref 1.5–5)
ERYTHROCYTE [DISTWIDTH] IN BLOOD BY AUTOMATED COUNT: 12.8 % (ref 11.5–14.5)
GLOBULIN SER-MCNC: 3.3 GM/DL
GLUCOSE SERPL-MCNC: 97 MG/DL (ref 70–110)
GRANULOCYTES # BLD: 11.8 10*3/UL (ref 1.4–6.5)
GRANULOCYTES NFR BLD: 80.4 % (ref 50–68)
HCT VFR BLD CALC: 42.4 % (ref 36–48)
LYMPHOCYTES # BLD: 1.9 10*3/UL (ref 1.2–3.4)
LYMPHOCYTES NFR BLD AUTO: 13.1 % (ref 22–35)
MCH RBC QN AUTO: 33.8 PG (ref 25–35)
MCHC RBC AUTO-ENTMCNC: 35.1 G/DL (ref 31–37)
MCV RBC AUTO: 96.1 FL (ref 80–105)
MONOCYTES # BLD AUTO: 0.9 10*3/UL (ref 0.1–0.6)
MONOCYTES NFR BLD: 5.9 % (ref 1–6)
PLATELET # BLD: 145 10^3/UL (ref 120–450)
PMV BLD AUTO: 10.6 FL (ref 7–11)
POTASSIUM SERPL-SCNC: 3.7 MMOL/L (ref 3.6–5)
PROT SERPL-MCNC: 7.1 G/DL (ref 5.8–8.3)
SODIUM SERPL-SCNC: 139 MMOL/L (ref 132–148)
WBC # BLD AUTO: 14.7 10^3/UL (ref 4.5–11)

## 2017-12-25 NOTE — ED PDOC
Arrival/HPI





- General


Historian: Patient, EMS





<Jes Maya - Last Filed: 12/26/17 14:20>





<Valdez Garcia - Last Filed: 12/28/17 07:43>





- General


Chief Complaint: Alcohol Ingestion


Time Seen by Provider: 12/25/17 19:23





- History of Present Illness


Narrative History of Present Illness (Text): 





12/25/17 20:24


62yr old female presents today brought in by ambulance after being found 

outside of her house unable to get into her apartment. Pt denies any 

complaints. denies cp or sob. no abdominal pain. no n/v/d/c. pt denies drug 

use. denies cough. denies any complaints. states she was locked out of her 

house and wants to go home.  (Jes Maya)





Past Medical History





- Provider Review


Nursing Documentation Reviewed: Yes





- Travel History


Have you recently traveled outside [place] w/in the past 3 mons?: No





- Infectious Disease


Hx of Infectious Diseases: None





- Tetanus Immunization


Tetanus Immunization: Unknown





- Past Medical History


Past Medical History: Unable to Obtain





- Cardiac


Hx Hypertension: No





- Pulmonary


Hx Respiratory Disorders: No





- Neurological


Hx Seizures: No





- HEENT


Hx HEENT Disorder: No





- Renal


Hx Renal Disorder: No





- Endocrine/Metabolic


Hx Endocrine Disorders: No





- Hematological/Oncological


Hx Blood Disorders: No





- Integumentary


Hx Dermatological Disorder: No





- Musculoskeletal/Rheumatological


Hx Musculoskeletal Disorders: Yes


Hx Back Pain: Yes


Hx Falls: Yes (When pt took too much drugs as per pt.)


Other/Comment: Sciatica





- Gastrointestinal


Hx Gastrointestinal Disorders: No





- Genitourinary/Gynecological


Hx Sexually Transmitted Diseases: No





- Psychiatric


Hx Psychophysiologic Disorder: No


Hx Substance Use: Yes (Heroin, cocaine, benzo)





- Past Surgical History


Past Surgical History: Unable to Obtain





- Anesthesia


Hx Anesthesia: No





- Suicidal Assessment


Feels Threatened In Home Enviroment: No





<Jes Maya - Last Filed: 12/26/17 14:20>





Family/Social History





- Physician Review


Nursing Documentation Reviewed: Yes


Family/Social History: Unknown Family HX


Smoking Status: Heavy Smoker > 10 Cigarettes Daily


Hx Alcohol Use: Yes


Hx Substance Use: Yes (Heroin, cocaine, benzo)


Substance used: HEROIN, XANAX, COCAINE, OXYCODONE





<Jes Maya - Last Filed: 12/26/17 14:20>





Allergies/Home Meds





<Jes Maya - Last Filed: 12/26/17 14:20>





<Valdez Garcia - Last Filed: 12/28/17 07:43>


Allergies/Adverse Reactions: 


Allergies





Penicillins Allergy (Verified 11/29/17 18:04)


 ANAPHYLAXIS








Home Medications: 


 Home Meds











 Medication  Instructions  Recorded  Confirmed


 


No Known Home Med  11/29/17 12/25/17














Review of Systems





- Review of Systems


Constitutional: absent: Fatigue, Fevers


Respiratory: absent: SOB, Cough


Cardiovascular: absent: Chest Pain, Palpitations


Gastrointestinal: absent: Abdominal Pain, Nausea, Vomiting


Genitourinary Female: absent: Dysuria


Musculoskeletal: absent: Arthralgias


Skin: absent: Rash


Neurological: absent: Headache, Dizziness


Psychiatric: absent: Depression, Suicidal Ideation





<Jes Maya T - Last Filed: 12/26/17 14:20>





Physical Exam


Vital Signs Reviewed: Yes


Temperature: Afebrile


Blood Pressure: Normal


Pulse: Regular


Respiratory Rate: Normal


Appearance: Positive for: Well-Appearing, Non-Toxic, Comfortable


Pain Distress: None


Mental Status: Positive for: Alert and Oriented X 3





- Systems Exam


Head: Present: Atraumatic, Normocephalic.  No: Contusion, Swelling, Ecchymosis, 

Laceration


Pupils: Present: Pinpoint


Extroacular Muscles: Present: EOMI


Conjunctiva: Present: Normal


Mouth: Present: Moist Mucous Membranes


Nose (External): Present: Atraumatic


Nose (Internal): Present: Normal Inspection


Neck: Present: Normal Range of Motion


Respiratory/Chest: Present: Clear to Auscultation, Good Air Exchange.  No: 

Respiratory Distress, Accessory Muscle Use


Cardiovascular: Present: Regular Rate and Rhythm, Normal S1, S2.  No: Murmurs


Abdomen: No: Tenderness, Distention, Rebound, Guarding


Back: Present: Normal Inspection.  No: Midline Tenderness, Paraspinal Tenderness


Upper Extremity: Present: Normal ROM


Lower Extremity: Present: Normal ROM.  No: Normal Inspection, Tenderness, 

Swelling, Erythema


Neurological: Present: GCS=15, Speech Normal, Gait Normal


Skin: Present: Warm, Dry


Psychiatric: Present: Alert, Oriented x 3.  No: Suicidal Ideation, Homicidal 

Ideation





<Jes Maya KARLA - Last Filed: 12/26/17 14:20>


Vital Signs











  Temp Pulse Resp BP Pulse Ox


 


 12/26/17 06:40   75  18  120/76  96


 


 12/26/17 05:00   72  18  115/74  95


 


 12/26/17 02:57   64  20  106/74  95


 


 12/26/17 01:22   68  20  104/73  93 L


 


 12/25/17 23:22   71  18  115/76  100


 


 12/25/17 21:22   72  16  122/78  88 L


 


 12/25/17 19:43  98.1 F  79  18  127/78  92 L














Medical Decision Making





<Jes Maya - Last Filed: 12/26/17 14:20>





<Valdez Garcia - Last Filed: 12/28/17 07:43>


ED Course and Treatment: 





12/25/17 20:26


62yr old female BIBA for evaluation of etoh/drug use. Patient ambulated into ER 

with EMS. 





pt alert and oriented, without any complaints. 





oxygen saturation 85% pt given nasal canula o2 saturation improved slightly,


non rebreather applied; pt removed non rebreather to eat. vitals improved. pt 

able to maintain 02 saturation 93%.  








cbc wbc; 14.7


cmp: wnl





tylenol; wnl


Alcohol wnl





Salicylates wnl





UA; pending





uds: pending


ct head Pending





12/26/17 00:45


pt is alert, no distress; speaking in full sentences. o2 saturation now 92-94% 

on room air. 








12/26/17 00:57


case signed out to dr. garcia; pending UA/UDS, re-evaluation and disposition. 


 (Jes Maya)





EXAM: CT Head Without Intravenous Contrast


Dictated and Authenticated by: James Velez MD


12/26/2017 2:53 AM


IMPRESSION:


No evidence of an acute intracranial hemorrhage, midline shift or mass effect 

is identified.





12/26/17 03:01


Patient is sleeping and in no acute distress. 





12/26/17 05:54


Leaving Against Medical Advice (AMA):


The patient is choosing to leave against medical advice.  I have personally 

explained to the patient that choosing to do so may result in permanent bodily 

harm or death.  I have discussed at great length that without further 

evaluation and monitoring there may be unforeseen circumstances and/or 

deterioration causing permanent bodily harm or death as a result of their 

choice. The patient is alert, oriented, and shows the mental capacity to make 

clear decisions regarding the patients health care at this time. The patient 

continues to wish to leave against medical advice.  


In light of the patients decision to leave against medical advice, follow-up 

has been arranged and the patient is aware of the importance to following up as 

instructed.  The patient has been advised that they should return to the 

emergency room immediately if they change their mind at any time, or if their 

condition begins to change or worsen in any way.  (Valdez Garcia)





- Lab Interpretations


Microbiology Results: 


Microbiology Results





12/26/17 06:40   Urine,Clean Catch   Urine Culture - Preliminary


                            Gram Positive Cocci








Lab Results: 








 12/25/17 21:15 





 12/25/17 21:15 





 Lab Results





12/26/17 05:00: Urine Opiates Screen Positive H, Urine Methadone Screen Negative

, Ur Barbiturates Screen Negative, Ur Phencyclidine Scrn Negative, Ur 

Amphetamines Screen Negative, U Benzodiazepines Scrn Positive, U Oth Cocaine 

Metabols Positive H, U Cannabinoids Screen Negative


12/26/17 05:00: Urine Color Yellow, Urine Appearance Clear, Urine pH 7.5, Ur 

Specific Gravity 1.010, Urine Protein Negative, Urine Glucose (UA) Negative, 

Urine Ketones Negative, Urine Blood Negative, Urine Nitrate Negative, Urine 

Bilirubin Negative, Urine Urobilinogen 1.0 H, Ur Leukocyte Esterase Trace H, 

Urine RBC 0 - 2, Urine WBC 2 - 5, Ur Epithelial Cells 0 - 2, Urine Bacteria Few


12/25/17 21:15: WBC 14.7 H D, RBC 4.41, Hgb 14.9, Hct 42.4, MCV 96.1, MCH 33.8, 

MCHC 35.1, RDW 12.8, Plt Count 145, MPV 10.6, Gran % 80.4 H, Lymph % (Auto) 

13.1 L, Mono % (Auto) 5.9, Eos % (Auto) 0.3 L, Baso % (Auto) 0.3, Gran # 11.80 H

, Lymph # 1.9, Mono # 0.9 H, Eos # 0.1, Baso # 0.04


12/25/17 21:15: Alcohol, Quantitative < 10


12/25/17 21:15: Salicylates < 1 L, Acetaminophen < 10.0 L


12/25/17 21:15: Sodium 139, Potassium 3.7, Chloride 102, Carbon Dioxide 28, 

Anion Gap 12, BUN 9, Creatinine 0.7, Est GFR (African Amer) > 60, Est GFR (Non-

Af Amer) > 60, Random Glucose 97, Calcium 9.0, Total Bilirubin 1.0, AST 38 H D, 

ALT 41, Alkaline Phosphatase 81, Total Protein 7.1, Albumin 3.8, Globulin 3.3, 

Albumin/Globulin Ratio 1.2











- RAD Interpretation


Radiology Orders: 








12/25/17 20:07


CHEST PORTABLE [RAD] Stat 





12/26/17 01:53


HEAD W/O CONTRAST [CT] Stat 














Disposition/Present on Arrival





- Present on Arrival


History of DVT/PE: No


History of Uncontrolled Diabetes: No


Urinary Catheter: No


History of Decub. Ulcer: No


History Surgical Site Infection Following: None





<Jes Maya - Last Filed: 12/26/17 14:20>





- Present on Arrival


Any Indicators Present on Arrival: No





- Disposition


Have Diagnosis and Disposition been Completed?: Yes


Disposition Time: 05:46





<Valdez Garcia - Last Filed: 12/28/17 07:43>





- Disposition


Diagnosis: 


 Polysubstance abuse, Leukocytosis





Disposition: AGAINST MEDICAL ADVICE


Condition: UNKNOWN


Discharge Instructions (ExitCare):  Benzodiazepine Abuse (ED), Leukocytosis (ED)

, Polysubstance Abuse (ED), Against Medical Advice (ED)


Additional Instructions: 


return to er with wrosening symptoms or concerns. 


Referrals: 


[facility] [place]] - Follow up with primary


[facility] [Outside] - Follow up with primary


Forms:  [employer] (English)

## 2017-12-26 VITALS
SYSTOLIC BLOOD PRESSURE: 120 MMHG | RESPIRATION RATE: 18 BRPM | DIASTOLIC BLOOD PRESSURE: 76 MMHG | HEART RATE: 75 BPM | OXYGEN SATURATION: 96 %

## 2017-12-26 LAB
APPEARANCE UR: CLEAR
BACTERIA #/AREA URNS HPF: (no result) /[HPF]
BILIRUB UR-MCNC: NEGATIVE MG/DL
COLOR UR: YELLOW
EPI CELLS #/AREA URNS HPF: (no result) /HPF (ref 0–5)
GLUCOSE UR STRIP-MCNC: NEGATIVE MG/DL
KETONES UR STRIP-MCNC: NEGATIVE MG/DL
LEUKOCYTE ESTERASE UR-ACNC: (no result) LEU/UL
PH UR STRIP: 7.5 [PH] (ref 4.7–8)
PROT UR STRIP-MCNC: NEGATIVE MG/DL
RBC # UR STRIP: NEGATIVE /UL
RBC #/AREA URNS HPF: (no result) /HPF (ref 0–2)
SP GR UR STRIP: 1.01 (ref 1–1.03)
UROBILINOGEN UR STRIP-ACNC: 1 E.U./DL

## 2017-12-26 NOTE — RAD
HISTORY:

etoh  



COMPARISON:

04/13/2017 



FINDINGS:



LUNGS:

No active pulmonary disease.



PLEURA:

No significant pleural effusion identified, no pneumothorax apparent.



CARDIOVASCULAR:

Top-normal heart.  Pulmonary vascular congestion.



OSSEOUS STRUCTURES:

No significant abnormalities.



VISUALIZED UPPER ABDOMEN:

Normal.



OTHER FINDINGS:

None.



IMPRESSION:

No active pulmonary disease. No significant interval change compared 

to the prior examination(s).

## 2017-12-26 NOTE — CT
EXAM:

  CT Head Without Intravenous  Contrast



CLINICAL HISTORY:

  62 years old, female; Injury or trauma; Fall; Initial encounter; Concussion / 

head injury; Additional info: Drug use



TECHNIQUE:

  Axial computed tomography images of the head/brain without intravenous 

contrast.  All CT scans at this facility use one or more dose reduction 

techniques, viz.: automated exposure control; ma/kV adjustment per patient size 

(including targeted exams where dose is matched to indication; i.e. head); or 

iterative reconstruction technique.  140 images are submitted.



COMPARISON:

  CT - HEAD W/O CONTRAST 2016-04-25 00:46



FINDINGS:

  Brain:  Cerebral and cerebellar volume loss.  Patchy hypodensity is seen in 

the periventricular and subcortical white matter.  Right caudate head and right 

basal ganglia remote lacunar infarcts.  No hemorrhage.

  Ventricles:  Unremarkable.  No ventriculomegaly.

  Bones/joints:  Unremarkable.  No acute fracture.

  Soft tissues:  Unremarkable.

  Sinuses:  Patchy sinus disease.

  Mastoid air cells:  Unremarkable.  No mastoid effusion.

  Dental:  Edentulous maxilla and mandible.



IMPRESSION:     

  No evidence of an acute intracranial hemorrhage, midline shift or mass effect 

is identified.

## 2018-03-04 ENCOUNTER — HOSPITAL ENCOUNTER (INPATIENT)
Dept: HOSPITAL 31 - C.ER | Age: 63
LOS: 5 days | Discharge: HOME | DRG: 745 | End: 2018-03-09
Attending: PSYCHIATRY & NEUROLOGY | Admitting: PSYCHIATRY & NEUROLOGY
Payer: MEDICAID

## 2018-03-04 VITALS — BODY MASS INDEX: 24.5 KG/M2

## 2018-03-04 DIAGNOSIS — F14.90: ICD-10-CM

## 2018-03-04 DIAGNOSIS — F19.10: ICD-10-CM

## 2018-03-04 DIAGNOSIS — F10.10: ICD-10-CM

## 2018-03-04 DIAGNOSIS — F17.210: ICD-10-CM

## 2018-03-04 DIAGNOSIS — F11.23: Primary | ICD-10-CM
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
## 2018-03-04 LAB
ALBUMIN SERPL-MCNC: 3.8 G/DL (ref 3.5–5)
ALBUMIN/GLOB SERPL: 1.1 {RATIO} (ref 1–2.1)
ALT SERPL-CCNC: 36 U/L (ref 9–52)
AST SERPL-CCNC: 35 U/L (ref 14–36)
BACTERIA #/AREA URNS HPF: (no result) /[HPF]
BASOPHILS # BLD AUTO: 0 K/UL (ref 0–0.2)
BASOPHILS NFR BLD: 0.2 % (ref 0–2)
BILIRUB UR-MCNC: NEGATIVE MG/DL
BUN SERPL-MCNC: 10 MG/DL (ref 7–17)
CALCIUM SERPL-MCNC: 9 MG/DL (ref 8.6–10.4)
EOSINOPHIL # BLD AUTO: 0.2 K/UL (ref 0–0.7)
EOSINOPHIL NFR BLD: 2.7 % (ref 0–4)
ERYTHROCYTE [DISTWIDTH] IN BLOOD BY AUTOMATED COUNT: 13.5 % (ref 11.5–14.5)
GFR NON-AFRICAN AMERICAN: > 60
GLUCOSE UR STRIP-MCNC: NORMAL MG/DL
HGB BLD-MCNC: 15.3 G/DL (ref 11–16)
LEUKOCYTE ESTERASE UR-ACNC: (no result) LEU/UL
LYMPHOCYTES # BLD AUTO: 2.2 K/UL (ref 1–4.3)
LYMPHOCYTES NFR BLD AUTO: 35.9 % (ref 20–40)
MCH RBC QN AUTO: 34.5 PG (ref 27–31)
MCHC RBC AUTO-ENTMCNC: 35.7 G/DL (ref 33–37)
MCV RBC AUTO: 96.4 FL (ref 81–99)
MONOCYTES # BLD: 0.4 K/UL (ref 0–0.8)
MONOCYTES NFR BLD: 7.2 % (ref 0–10)
NEUTROPHILS # BLD: 3.3 K/UL (ref 1.8–7)
NEUTROPHILS NFR BLD AUTO: 54 % (ref 50–75)
NRBC BLD AUTO-RTO: 0 % (ref 0–2)
PH UR STRIP: 7 [PH] (ref 5–8)
PLATELET # BLD: 175 K/UL (ref 130–400)
PMV BLD AUTO: 8.6 FL (ref 7.2–11.7)
PROT UR STRIP-MCNC: NEGATIVE MG/DL
RBC # BLD AUTO: 4.45 MIL/UL (ref 3.8–5.2)
RBC # UR STRIP: NEGATIVE /UL
SP GR UR STRIP: 1.02 (ref 1–1.03)
SQUAMOUS EPITHIAL: < 1 /HPF (ref 0–5)
URINE AMORPHOUS SEDIMENT: (no result) /UL
UROBILINOGEN UR-MCNC: 4 MG/DL (ref 0.2–1)
WBC # BLD AUTO: 6 K/UL (ref 4.8–10.8)

## 2018-03-04 PROCEDURE — HZ2ZZZZ DETOXIFICATION SERVICES FOR SUBSTANCE ABUSE TREATMENT: ICD-10-PCS | Performed by: PSYCHIATRY & NEUROLOGY

## 2018-03-04 NOTE — C.PDOC
History Of Present Illness


62 yr old female presents to the ER for heroin and alcohol detox. Patient is a 

pre-screen from yesterday but comes in today. Denies SI, HI, hallucinations, 

fever, chills, chest pain, SOB, nausea, vomiting, weakness or numbness. 


Time Seen by Provider: 03/04/18 14:52


Chief Complaint (Nursing): Substance Abuse


History Per: Patient


History/Exam Limitations: no limitations





Past Medical History


Reviewed: Historical Data, Nursing Documentation, Vital Signs


Vital Signs: 


 Last Vital Signs











Temp  97.2 F L  03/04/18 13:21


 


Pulse  76   03/04/18 13:21


 


Resp  18   03/04/18 13:21


 


BP  100/66   03/04/18 13:21


 


Pulse Ox  97   03/04/18 17:15














- [facility] Procedures








APPLICATION OF SPLINT (09/11/05)


DETOXIFICATION SERVICES FOR SUBSTANCE ABUSE TREATMENT (06/13/16)


GROUP PSYCHOTHERAPY (06/13/16)


INJECT/INFUSE NEC (01/15/07)








Family History: States: No Known Family Hx





- Social History


Hx Alcohol Use: Yes


Hx Substance Use: Yes (Heroin, cocaine, benzo)





- Immunization History


Hx Tetanus Toxoid Vaccination: No


Hx Influenza Vaccination: No


Hx Pneumococcal Vaccination: No





Review Of Systems


Except As Marked, All Systems Reviewed And Found Negative.


Constitutional: Negative for: Fever, Chills


Cardiovascular: Negative for: Chest Pain


Respiratory: Negative for: Shortness of Breath


Gastrointestinal: Negative for: Nausea, Vomiting


Neurological: Negative for: Weakness, Numbness


Psych: Negative for: Suicidal ideation





Physical Exam





- Physical Exam


Appears: Non-toxic, No Acute Distress


Skin: Warm, Dry


Eye(s): bilateral: Normal Inspection, PERRL, EOMI


Oral Mucosa: Moist


Lips: Normal Appearing


Cardiovascular: Rhythm Regular, No Murmur


Respiratory: Normal Breath Sounds, No Rales, No Rhonchi, No Stridor, No Wheezing


Extremity: Normal ROM, No Swelling


Neurological/Psych: Oriented x3, Normal Speech





ED Course And Treatment





- Laboratory Results


Result Diagrams: 


 03/04/18 15:49





 03/04/18 15:49


O2 Sat by Pulse Oximetry: 97 (RA)


Pulse Ox Interpretation: Normal


Progress Note: Labs all came back normal. Patient is medically cleared. Patient 

is admitted to Detox by .





Medical Decision Making


Medical Decision Making: 


PLAN:


* Alcohol Serum 


* Drug Screen 


* Labs


* Urinalysis 


* Nicoderm TD 


* Librium PO 








Disposition





- Disposition


Disposition: HOSPITALIZED


Disposition Time: 18:28


Condition: STABLE


Forms:  [facility] (English)





- Clinical Impression


Clinical Impression: 


 Drug dependence, Alcohol dependence








- PA / NP / Resident Statement


MD/DO has reviewed & agrees with the documentation as recorded.





- Scribe Statement


The provider has reviewed the documentation as recorded by the Scribe


Augusta Wiley





Decision To Admit





- Pt Status Changed To:


Hospital Disposition Of: Inpatient





- Admit Certification


Admit to Inpatient:: After my assessment, the patient will require 

hospitalization for at least two midnights.  This is because of the severity of 

symptoms shown, intensity of services needed, and/or the medical risk in this 

patient being treated as an outpatient.





- InPatient:


Physician Admission Certification: I certify that this patient requires 2 or 

more midnights of care for the following reason:: needs more than 2 days of 

hospitalization





- .


Bed Request Type: Detox


Admitting Physician: Effie Wallace


Patient Diagnosis: 


 Drug dependence, Alcohol dependence

## 2018-03-04 NOTE — PCM.BM
<Ese Aburto - Last Filed: 03/04/18 18:54>





Treatment Plan Problems





- Problems identified on initial assessmt


  ** potiential for automnomic instability relatd to alcohol withdrawal


Date Initiated: 03/04/18


Time Initiated: 18:55


Assessment reference: NA


Status: Active





  ** potiential for opiate withdrawal


Date Initiated: 03/04/18


Time Initiated: 18:55


Assessment reference: NA


Status: Active





Treatment assets and liabiliti


Patient Assests: ADL independent, cognitively intact


Patient Liabilities: substance abuse, legal issue





- Milieu Protocol


Maintain good personal hygiene: daily Encourage regular showers, daily Remind 

patient to perform daily oral care, daily Assist patient to perform ADL's


Maintain personal safety: every shift Educate patient to report safety concerns 

to staff, every shift Monitor environment for contraband/sharps


Medication safety: Monitor for expected outcome, potential side effects: every 

shift, Assess barriers to learning: every shift, Assess readiness for 

medication education: every shift





<Mauri Schulz - Last Filed: 03/06/18 08:58>





- Diagnosis


(1) Opioid use disorder, severe, dependence


Status: Acute   


Interventions: 





03/06/18 08:58


* Assess 7x/week regarding severity of withdrawal


* Educate regarding risks, benefits, side effects and alternatives of 

medications


* Use Motivational Interviewing for abstinence


* Use CBT for relapse prevention


* Medication management for withdrawal symptoms


* Encourage medication assisted treatment


*
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
## 2018-03-06 NOTE — PCM.PYCHPN
Psychiatric Progress Note





- Psychiatric Progress Note


Patient seen today, length of contact: 16 min


Patient Chief Complaint: 


"I'm feeling better"


Problems Identified/Issues Discussed: 





The pt is seen, chart reviewed, case discussed with staff. Patient states she 

is feeling much better overall. She is compliant with medications and reports 

no side-effects. Symptoms improving and patient needs more time to stabilize. 

After care discussed, support and psychoeducation given.








Medication Change: Yes (detox changes daily)


Medical Record Reviewed: Yes





Mental Status Examination





- Cognitive Function


Orientation: Person, Place, Situation, Time


Memory: Intact


Attention: WNL


Concentration: WNL


Association: WNL


Fund of Knowledge: WNL





- Mood


Mood: Neutral





- Affect


Affect: Broad





- Speech


Speech: Appropriate





- Formal Thought Process


Formal Thought Process: No Impairment





- Suicidal Ideation


Suicidal Ideation: No





- Homicidal Ideation


Homicidal Ideation: No





Goal/Treatment Plan





- Goal/Treatment Plan


Need for Continued Stay: Remain at risks for inpatient hospitalization, 

Discharge may exacerbated symptoms, Severe functional impairment


Progress Toward Problem(s) and Goals/Treatment Plan: 





Methadone detox


Gabapentin for augmentation


As needed medications


All risks, benefits and alternatives of the meds discussed,


and the pt agreed and understood. 


Attend groups and activities


Supportive therapy and psychoeducation


MI for abstinence


CBT for relapse prevention


Encourage MAT


Refer to rehab or IOP, and self-help groups


Smoking cessation with MI


Nicotine patch








Estimated Date of D/C: 03/09/18





- Smoking Cessation


Smoking Cessation Initiated: Yes

## 2018-03-07 NOTE — PCM.PYCHPN
Psychiatric Progress Note





- Psychiatric Progress Note


Patient seen today, length of contact: 17 min


Patient Chief Complaint: 


"I'm doing better"


Problems Identified/Issues Discussed: 





The pt is seen, chart reviewed, case discussed with staff. Patient reports 

sleeping well last night and she is feeling well overall today. She is 

compliant with medications and reports no side-effects. Symptoms improving and 

patient needs more time to stabilize. After care discussed, support and 

psychoeducation given. Patient expresses her concern about her upcoming 

discharge, and her worry that she is going to start using again once she 

returns to her home. Patient instructed that she will receive prescriptions for 

medications to help curb any withdrawal symptoms through next week until she is 

able to start suboxone maintenance. Risks were addressed. Patient agrees with 

this plan.








Medication Change: Yes (detox changes daily)


Medical Record Reviewed: Yes





Mental Status Examination





- Cognitive Function


Orientation: Person, Place, Situation, Time


Memory: Intact


Attention: WNL


Concentration: WNL


Association: WNL


Fund of Knowledge: WNL





- Mood


Mood: Neutral





- Affect


Affect: Broad





- Speech


Speech: Appropriate





- Formal Thought Process


Formal Thought Process: No Impairment





- Suicidal Ideation


Suicidal Ideation: No





- Homicidal Ideation


Homicidal Ideation: No





Goal/Treatment Plan





- Goal/Treatment Plan


Need for Continued Stay: Remain at risks for inpatient hospitalization, 

Discharge may exacerbated symptoms, Severe functional impairment


Progress Toward Problem(s) and Goals/Treatment Plan: 





Methadone detox for heroin


Librium detox for benzos


Gabapentin for augmentation


As needed medications


All risks, benefits and alternatives of the meds discussed,


and the pt agreed and understood. 


Attend groups and activities


Supportive therapy and psychoeducation


MI for abstinence


CBT for relapse prevention


Encourage MAT


Refer to rehab or IOP, and self-help groups


Smoking cessation with MI


Nicotine patch








Estimated Date of D/C: 03/09/18





- Smoking Cessation


Smoking Cessation Initiated: Yes

## 2018-03-08 NOTE — PCM.PYCHPN
Psychiatric Progress Note





- Psychiatric Progress Note


Patient seen today, length of contact: 18 min


Patient Chief Complaint: 


"I'm doing good"


Problems Identified/Issues Discussed: 





The pt is seen, chart reviewed, case discussed with staff. The pt is compliant 

with medications and reports no side-effects. Symptoms improving and patient 

needs more time to stabilize. After care discussed, support and psychoeducation 

given.








Medication Change: Yes (detox changes daily)


Medical Record Reviewed: Yes





Mental Status Examination





- Cognitive Function


Orientation: Person, Place, Situation, Time


Memory: Intact


Attention: WNL


Concentration: WNL


Association: WNL


Fund of Knowledge: WNL





- Mood


Mood: Neutral





- Affect


Affect: Broad





- Speech


Speech: Appropriate





- Formal Thought Process


Formal Thought Process: No Impairment





- Suicidal Ideation


Suicidal Ideation: No





- Homicidal Ideation


Homicidal Ideation: No





Goal/Treatment Plan





- Goal/Treatment Plan


Need for Continued Stay: Remain at risks for inpatient hospitalization, 

Discharge may exacerbated symptoms, Severe functional impairment


Progress Toward Problem(s) and Goals/Treatment Plan: 





Methadone detox for heroin


Librium detox for benzos


Gabapentin for augmentation


As needed medications


All risks, benefits and alternatives of the meds discussed,


and the pt agreed and understood. 


Attend groups and activities


Supportive therapy and psychoeducation


MI for abstinence


CBT for relapse prevention


Encourage MAT


Refer to rehab or IOP, and self-help groups


Smoking cessation with MI


Nicotine patch








Estimated Date of D/C: 03/09/18





- Smoking Cessation


Smoking Cessation Initiated: Yes

## 2018-03-09 VITALS
RESPIRATION RATE: 19 BRPM | DIASTOLIC BLOOD PRESSURE: 63 MMHG | SYSTOLIC BLOOD PRESSURE: 104 MMHG | TEMPERATURE: 98.1 F | OXYGEN SATURATION: 99 % | HEART RATE: 64 BPM
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
## 2018-05-29 ENCOUNTER — HOSPITAL ENCOUNTER (INPATIENT)
Dept: HOSPITAL 31 - C.ER | Age: 63
LOS: 6 days | Discharge: HOME | DRG: 744 | End: 2018-06-04
Attending: PSYCHIATRIC UNIT | Admitting: PSYCHIATRIC UNIT
Payer: MEDICAID

## 2018-05-29 VITALS — BODY MASS INDEX: 24.5 KG/M2

## 2018-05-29 DIAGNOSIS — F41.9: ICD-10-CM

## 2018-05-29 DIAGNOSIS — F11.23: Primary | ICD-10-CM

## 2018-05-29 DIAGNOSIS — F10.230: ICD-10-CM

## 2018-05-29 DIAGNOSIS — F14.10: ICD-10-CM

## 2018-05-29 DIAGNOSIS — F10.220: ICD-10-CM

## 2018-05-29 DIAGNOSIS — F17.200: ICD-10-CM
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
## 2018-05-29 DIAGNOSIS — N39.0: ICD-10-CM

## 2018-05-29 DIAGNOSIS — F12.10: ICD-10-CM

## 2018-05-29 DIAGNOSIS — F15.10: ICD-10-CM

## 2018-05-29 NOTE — C.PDOC
History Of Present Illness


Patient presents to the ER requesting detox from ETOH. Denies physical 

complaints at this time.


Time Seen by Provider: 05/29/18 23:39


Chief Complaint (Nursing): Substance Abuse


History Per: Patient


History/Exam Limitations: no limitations


Onset/Duration Of Symptoms: Hrs


Current Symptoms Are (Timing): Still Present


Suicide/Self Injury Attempted (Context): None


Modifying Factor(s): None


Severity: None


Pain Scale Rating Of: 0


Associated Symptoms: denies: Depression, Suicidal Thoughts


Involuntary Hold By: None


Recent travel outside of the [place]: No





Past Medical History


Reviewed: Historical Data, Nursing Documentation, Vital Signs


Vital Signs: 


 Last Vital Signs











Temp  98.1 F   05/29/18 23:34


 


Pulse  75   05/29/18 23:34


 


Resp  20   05/29/18 23:34


 


BP  109/67   05/29/18 23:34


 


Pulse Ox  96   05/30/18 01:06














- [facility] Procedures








APPLICATION OF SPLINT (09/11/05)


DETOXIFICATION SERVICES FOR SUBSTANCE ABUSE TREATMENT (03/04/18)


GROUP PSYCHOTHERAPY (06/13/16)


INJECT/INFUSE NEC (01/15/07)








Family History: States: No Known Family Hx





- Social History


Hx Alcohol Use: Yes


Hx Substance Use: Yes





- Immunization History


Hx Tetanus Toxoid Vaccination: No


Hx Influenza Vaccination: No


Hx Pneumococcal Vaccination: No





Review Of Systems


Constitutional: Negative for: Fever, Chills


Cardiovascular: Negative for: Chest Pain, Palpitations


Respiratory: Negative for: Cough, Shortness of Breath


Gastrointestinal: Negative for: Nausea, Vomiting





Physical Exam





- Physical Exam


Appears: Non-toxic


Skin: Warm, Dry


Head: Normacephalic


Eye(s): bilateral: Normal Inspection


Oral Mucosa: Moist


Neck: Supple


Chest: Symmetrical, No Tenderness


Cardiovascular: Rhythm Regular


Respiratory: No Rales, No Rhonchi, No Wheezing


Gastrointestinal/Abdominal: Soft, No Tenderness


Back: Normal Inspection


Extremity: Normal ROM


Extremity: Bilateral: Atraumatic


Neurological/Psych: Oriented x3


Gait: Steady





ED Course And Treatment





- Laboratory Results


Result Diagrams: 


 05/29/18 23:56





 05/29/18 23:56


O2 Sat by Pulse Oximetry: 96 (Room air)


Pulse Ox Interpretation: Normal


Progress Note: Blood work and urinalysis ordered. Macrobid administered. Crisis 

notified.





Disposition


Discussed With : Baudilio Marie


Comment: accepted the pt on his service and took over the care at 1:28AM


Doctor Will See Patient In The: Hospital


Counseled Patient/Family Regarding: Studies Performed, Diagnosis





- Disposition


Disposition: HOSPITALIZED


Disposition Time: 23:39


Condition: FAIR


Forms:  [facility] (English)





- POA


Present On Arrival: None





- Clinical Impression


Clinical Impression: 


 UTI (urinary tract infection), Alcohol dependence, Opioid use disorder, severe

, dependence








- Scribe Statement


The provider has reviewed the documentation as recorded by the Scribsymone Gamez





All medical record entries made by the Scribe were at my direction and 

personally dictated by me. I have reviewed the chart and agree that the record 

accurately reflects my personal performance of the history, physical exam, 

medical decision making, and the department course for this patient. I have 

also personally directed, reviewed, and agree with the discharge instructions 

and disposition.





Decision To Admit





- Pt Status Changed To:


Hospital Disposition Of: Inpatient





- Admit Certification


Admit to Inpatient:: After my assessment, the patient will require 

hospitalization for at least two midnights.  This is because of the severity of 

symptoms shown, intensity of services needed, and/or the medical risk in this 

patient being treated as an outpatient.





- InPatient:


Physician Admission Certification: I certify that this patient requires 2 or 

more midnights of care for the following reason:: After my assessment, the 

patient will require hospitalization for at least two midnights.  This is 

because of the severity of symptoms shown, intensity of services needed, and/or 

the medical risk in this patient being treated as an outpatient.





- .


Bed Request Type: Detox


Admitting Physician: Baudilio Marie


Patient Diagnosis: 


 UTI (urinary tract infection), Alcohol dependence, Opioid use disorder, severe

, dependence

## 2018-05-30 LAB
ALBUMIN SERPL-MCNC: 3.8 G/DL (ref 3.5–5)
ALBUMIN/GLOB SERPL: 1 {RATIO} (ref 1–2.1)
ALT SERPL-CCNC: 27 U/L (ref 9–52)
AST SERPL-CCNC: 49 U/L (ref 14–36)
BACTERIA #/AREA URNS HPF: (no result) /[HPF]
BASOPHILS # BLD AUTO: 0 K/UL (ref 0–0.2)
BASOPHILS NFR BLD: 0.3 % (ref 0–2)
BILIRUB UR-MCNC: NEGATIVE MG/DL
BUN SERPL-MCNC: 11 MG/DL (ref 7–17)
CALCIUM SERPL-MCNC: 8.4 MG/DL (ref 8.6–10.4)
EOSINOPHIL # BLD AUTO: 0.3 K/UL (ref 0–0.7)
EOSINOPHIL NFR BLD: 3.7 % (ref 0–4)
ERYTHROCYTE [DISTWIDTH] IN BLOOD BY AUTOMATED COUNT: 13.9 % (ref 11.5–14.5)
GFR NON-AFRICAN AMERICAN: 56
GLUCOSE UR STRIP-MCNC: NORMAL MG/DL
HGB BLD-MCNC: 15 G/DL (ref 11–16)
HYALINE CASTS #/AREA URNS LPF: >20 /LPF (ref 0–2)
LEUKOCYTE ESTERASE UR-ACNC: (no result) LEU/UL
LYMPHOCYTES # BLD AUTO: 2.6 K/UL (ref 1–4.3)
LYMPHOCYTES NFR BLD AUTO: 32.5 % (ref 20–40)
MCH RBC QN AUTO: 33.8 PG (ref 27–31)
MCHC RBC AUTO-ENTMCNC: 35.3 G/DL (ref 33–37)
MCV RBC AUTO: 96 FL (ref 81–99)
MONOCYTES # BLD: 0.6 K/UL (ref 0–0.8)
MONOCYTES NFR BLD: 7.5 % (ref 0–10)
NEUTROPHILS # BLD: 4.5 K/UL (ref 1.8–7)
NEUTROPHILS NFR BLD AUTO: 56 % (ref 50–75)
NRBC BLD AUTO-RTO: 0.1 % (ref 0–2)
PH UR STRIP: 5 [PH] (ref 5–8)
PLATELET # BLD: 194 K/UL (ref 130–400)
PMV BLD AUTO: 8.4 FL (ref 7.2–11.7)
PROT UR STRIP-MCNC: (no result) MG/DL
RBC # BLD AUTO: 4.43 MIL/UL (ref 3.8–5.2)
RBC # UR STRIP: NEGATIVE /UL
SP GR UR STRIP: 1.02 (ref 1–1.03)
SQUAMOUS EPITHIAL: 8 /HPF (ref 0–5)
UROBILINOGEN UR-MCNC: 4 MG/DL (ref 0.2–1)
WBC # BLD AUTO: 8 K/UL (ref 4.8–10.8)

## 2018-05-30 PROCEDURE — GZHZZZZ GROUP PSYCHOTHERAPY: ICD-10-PCS | Performed by: PSYCHIATRY & NEUROLOGY

## 2018-05-30 PROCEDURE — HZ2ZZZZ DETOXIFICATION SERVICES FOR SUBSTANCE ABUSE TREATMENT: ICD-10-PCS | Performed by: PSYCHIATRY & NEUROLOGY

## 2018-05-30 NOTE — PCM.PSYCH
Initial Psychiatric Evaluation





- Initial Psychiatric Evaluation


Type of Admission: Voluntary


Legal Status: Capacity


Chief Complaint (in patient's own words): 


"I need help getting off drugs and I need detox from my addiction"





History of Present Illness and Precipitating Events: 


This is a 62 year old female, , with 4 children (41 y/o, 35 y/o, 31 y/o, 

24 y/o), lives with her one daughter (with grandchild and son in law in the 

apartment), unemployed, with Medicaid, who presented to the ED requesting 

detox. Patient reports a >30 year history of using heroin. She admits to using 

a variety of substances including heroin (snorted), oxycodone, cocaine (snorted)

, crack cocaine, Xanax, EtOH (3 cans of beer per day), and tobacco (1 PPD for 

20 years). Denies the use of marijuana, MDMA< and PCP. 





Her last time here was in March. Patient planned on going to [facility] in 

[place] after discharge but decided right after discharge she could not do it. 

She relapsed the day after and started heroin again (10 bags a day). She 

expresses the desire to go a program right now. She expresses her concern of 

her inability to quit completely. Patient further explains that she has 

attempted to quit many times throughout her life, and that her longest periods 

of sobriety were when she was having her children. She recently lost her 

 of 36 years this past August. Since then, she has been bereaving and 

sought out using again. 





Patient does have withdrawal symptoms complaining of nausea, diarrhea, vomiting

, joint pain, and jitteriness. Denies chest pain, shortness of breath, fever, 

or chills. 





Detox Hx: 3x in the past; denies overdose


Rehab Hx: 1x at [facility] over 20 years ago, has done NA and AA meetings in 

the past


Medical Hx: denies


Surgical Hx: denies


Medications: denies


Psych Hx: has required weekly mental health counseling as part of her probation 

in the past


Fam Hx: her brother had an overdose in the past


Legal: No longer on probation


Allergies: Penicillin





Current Medications: 





Active Medications











Generic Name Dose Route Start Last Admin





  Trade Name Freq  PRN Reason Stop Dose Admin


 


Chlordiazepoxide  25 mg  05/30/18 04:13  05/30/18 11:08





  Librium  PO  06/03/18 04:12  25 mg





  Q6 SOL   Administration





  Taper   


 


Hydroxyzine HCl  25 mg  05/30/18 04:13  05/30/18 04:44





  Atarax  PO   25 mg





  Q6H PRN   Administration





  Anxiety   


 


Methadone HCl  25 mg  05/30/18 10:00  05/30/18 10:15





  Methadone  PO  06/05/18 09:59  25 mg





  Q24H SOL   Administration





  Taper   


 


Nicotine  1 patch  05/30/18 10:00  05/30/18 10:15





  Nicoderm Cq  TD   1 patch





  DAILY SOL   Administration














Past Psychiatric History





- Past Psychiatric History


Previous Treatment History: Intensive Outpatient


Pertinent Medical Hx (Current Medical&Sleep Prob, Allergies): 





 Allergies











Allergy/AdvReac Type Severity Reaction Status Date / Time


 


Penicillins Allergy  ANAPHYLAXIS Verified 03/04/18 13:20








 





Gabapentin [Neurontin] 100 mg PO TID #90 cap 03/09/18 


hydrOXYzine HCl [Atarax] 25 mg PO BID PRN #60 tab 03/09/18 


traZODone [Desyrel] 50 mg PO HS PRN #30 tab 03/09/18 











Review of Systems





- Review of Systems


All systems: reviewed and no additional remarkable complaints except





- Neurological


Neurological: UNREMARKABLE





- Psychiatric


Psychiatric: Abnormal Sleep Pattern, Anxiety, Difficulty Concentrating.  absent

: Anhedonia, Auditory Hallucinations, Depression, Hallucinations, Homicidal 

Ideation, Paranoia, Suicidal Ideation





Mental Status Examination





- Personal Presentation


Personal Presentation: Looks stated age





- Affect


Affect: Broad





- Motor Activity


Motor Activity: Calm





- Reliability in Providing Information


Reliability in Providing Information: Good





- Speech


Speech: Organized





- Formal Thought Process


Formal Thought Process: No Impairment





- Cognitive Functions


Orientation: Person, Place, Situation, Time


Sensorium: Alert


Attention/Concentration: Attentive


Estimate of Intelligence: Average


Judgement: Intact, as evidence by: Insight regarding need for hospitalization


Memory: Recent intact, as evidence by: Ability to recall events of the day, 

Remote intact, as evidenced by: Abilit to recall sig. life events





- Risk


Risk: Withdrawal, Diminished functioning





- Strength & Assets Inventory


Strength & Assets Inventory: Cooperative





- Limitations


Limitations: Living alone





DSM 5 DX





- DSM 5


DSM 5 Diagnosis: 


Opioid Withdrawal, moderate


Opioid Use Disorder, moderate


Alcohol use d/o -severe


Alcohol withdrawal


Tobacco Use Disorder, moderate


Sedative hypnotic or anxiolytic use d/o








- Recommended/Plan of Treatment


Treatment Recommendations and Plan of Treatment: 


Methadone detox


Librium detox


Macrobid for UTI


As needed medications


Attend groups and activities


Supportive therapy and psychoeducation


MI for abstinence


CBT for relapse prevention


Encourage MAT


Smoking Cessation


Refer to rehab or IOP


Attend self-help groups as well


   


34 min





Projected ELOS: 5 days





- Smoking Cessation


Smoking Cessation Initiated: Yes

## 2018-05-30 NOTE — PCM.BM
<Farzana Roman - Last Filed: 05/30/18 02:38>





Treatment Plan Problems





- Problems identified on initial assessmt


  ** OPIATE DEPENDENCE


Date Initiated: 05/30/18


Time Initiated: 02:39


Assessment reference: NA


Status: Active





Treatment assets and liabiliti


Patient Assests: ADL independent, cognitively intact


Patient Liabilities: substance abuse





- Milieu Protocol


Maintain good personal hygiene: daily Encourage regular showers, daily Remind 

patient to perform daily oral care, daily Assist patient to perform ADL's


Maintain personal safety: every shift Educate patient to report safety concerns 

to staff, every shift Monitor environment for contraband/sharps


Medication safety: Monitor for expected outcome, potential side effects: every 

shift, Assess barriers to learning: every shift, Assess readiness for 

medication education: every shift





<Mauri Schulz - Last Filed: 05/30/18 22:27>





- Diagnosis


(1) Alcohol dependence


Status: Acute   


Interventions: 





05/30/18 22:27


* Assess 7x/week regarding severity of withdrawal


* Educate regarding risks, benefits, side effects and alternatives of 

medications


* Use Motivational Interviewing for abstinence


* Use CBT for relapse prevention


* Medication management for withdrawal symptoms


* Encourage medication assisted treatment


* 








(2) Opioid use disorder, severe, dependence


Status: Acute   


Interventions: 





05/30/18 22:27


* Assess 7x/week regarding severity of withdrawal


* Educate regarding risks, benefits, side effects and alternatives of 

medications


* Use Motivational Interviewing for abstinence


* Use CBT for relapse prevention


* Medication management for withdrawal symptoms


* Encourage medication assisted treatment


* 








<Arlette Eddy - Last Filed: 05/31/18 10:17>





Family Contact


Family involvement: Famliy/SO not involved





- Goals for Treatment


Patient goals for treatment: Complete detox and transtion to long-term rehab.





Discharge/Continuing Care





- Education Needs


Education Needs: Family Medication, Family Diagnosis/Disease Process, Family 

Coping Skills, Family Anger Management skills, Family Placement options, Family 

Community resources, Patient Medication, Patient Diagnosis/Disease Process, 

Patient Coping Skills, Patient Anger Management skills, Patient Placement 

options, Patient Community resources





- Discharge


Discharge Criteria: No longer exhibiting s/s of withdrawal, Reduction of target 

symptoms


Discharge to:: Substance Abuse Rehab





- Treatment Team Participation


Patient/Family/SO Statement: 





05/31/18 10:16


"I wanna go to a long-term program from here. Outpatient isn't enough."


Discussed with Family/SO: No


Was Patient/Family/SO present at Treatment Team Meeting: Yes

## 2018-05-31 RX ADMIN — Medication SCH TAB: at 09:50

## 2018-05-31 NOTE — PCM.PYCHPN
Psychiatric Progress Note





- Psychiatric Progress Note


Patient seen today, length of contact: 18 min


Patient Chief Complaint: 


"Not good"


Problems Identified/Issues Discussed: 





The pt is seen, chart reviewed, case discussed with staff.


The pt is compliant with medications and reports no side-effects.


Symptoms are improving but needs more time to stabilize. 


After care discussed, support and psychoeducation given.


She was loud and verbally abusive - warned


She took her methadone earlybc of withdrawals this morning


Medication Change: Yes (detox changes daily)


Medical Record Reviewed: Yes





Mental Status Examination





- Cognitive Function


Orientation: Person, Place, Situation, Time


Memory: Intact


Attention: Poor


Concentration: Poor


Association: WNL


Fund of Knowledge: WNL





- Mood


Mood: Anxious, Other (irate)





- Affect


Affect: Broad





- Speech


Speech: Appropriate





- Formal Thought Process


Formal Thought Process: No Impairment





- Suicidal Ideation


Suicidal Ideation: No





- Homicidal Ideation


Homicidal Ideation: No





Goal/Treatment Plan





- Goal/Treatment Plan


Need for Continued Stay: Discharge may exacerbated symptoms, Severe functional 

impairment


Progress Toward Problem(s) and Goals/Treatment Plan: 


Methadone detox


Librium detox


Macrobid for UTI


As needed medications


Attend groups and activities


Supportive therapy and psychoeducation


MI for abstinence


CBT for relapse prevention


Encourage MAT


Smoking Cessation


Refer to rehab or IOP


Attend self-help groups as well

## 2018-06-01 RX ADMIN — Medication SCH TAB: at 09:06

## 2018-06-01 NOTE — PCM.PYCHPN
Psychiatric Progress Note





- Psychiatric Progress Note


Patient seen today, length of contact: 20 min


Patient Chief Complaint: 


"Anxious"


Problems Identified/Issues Discussed: 





The pt is seen, chart reviewed, case discussed with staff.


Support and psychoeducation given, CBT and MI used briefly


No new symptoms reported, improving slowly and needs more time


No SEs from medications, risks discussed.


After care discussed - mostly interested in rehab and then move to her brother'

s house


She remains irate - spoke with her. 


Medication Change: Yes (detox changes daily)


Medical Record Reviewed: Yes





Mental Status Examination





- Cognitive Function


Orientation: Person, Place, Situation, Time


Memory: Intact


Attention: Poor


Concentration: Poor


Association: WNL


Fund of Knowledge: WNL





- Mood


Mood: Anxious, Other (irate)





- Affect


Affect: Broad





- Speech


Speech: Appropriate





- Formal Thought Process


Formal Thought Process: No Impairment





- Suicidal Ideation


Suicidal Ideation: No





- Homicidal Ideation


Homicidal Ideation: No





Goal/Treatment Plan





- Goal/Treatment Plan


Need for Continued Stay: Discharge may exacerbated symptoms, Severe functional 

impairment


Progress Toward Problem(s) and Goals/Treatment Plan: 


Methadone detox


Librium detox


Macrobid for UTI


As needed medications


Attend groups and activities


Supportive therapy and psychoeducation


MI for abstinence


CBT for relapse prevention


Encourage MAT


Smoking Cessation


Refer to rehab or IOP


Attend self-help groups as well

## 2018-06-02 RX ADMIN — Medication SCH TAB: at 09:04

## 2018-06-02 NOTE — PCM.PYCHPN
Psychiatric Progress Note





- Psychiatric Progress Note


Patient seen today, length of contact: 15  min


Patient Chief Complaint: 





I am still having withdrawal symptoms


Problems Identified/Issues Discussed: 





Patient seen and evaluated, chart reviewed and discussed with the nurse.





Patient reports withdrawal symptoms including abdominal cramps, joint pains, 

nausea, anxiety, and headaches. He reports irritability but denies any feelings 

of hopelessness and helplessness. He denies any auditory or visual 

hallucinations, or any psychotic symptoms. 





He reports improvement in his sleep and appetite. 


He is tolerating the withdrawal medications and denies any side effects. 





Supportive therapy and psychoeducation were given.


Medication Change: Yes (detox changes daily)


Medical Record Reviewed: Yes





Mental Status Examination





- Cognitive Function


Orientation: Person, Place, Situation, Time


Memory: Intact


Attention: Poor


Concentration: Poor


Association: WNL


Fund of Knowledge: WNL





- Mood


Mood: Anxious, Other (irate)





- Affect


Affect: Broad





- Speech


Speech: Appropriate





- Formal Thought Process


Formal Thought Process: No Impairment





- Suicidal Ideation


Suicidal Ideation: No





- Homicidal Ideation


Homicidal Ideation: No





Goal/Treatment Plan





- Goal/Treatment Plan


Need for Continued Stay: Discharge may exacerbated symptoms, Severe functional 

impairment


Progress Toward Problem(s) and Goals/Treatment Plan: 





Methadone detox


Librium detox


Macrobid for UTI


As needed medications


Attend groups and activities


Supportive therapy and psychoeducation


MI for abstinence


CBT for relapse prevention


Encourage MAT


Smoking Cessation


Refer to rehab or IOP


Attend self-help groups as well

## 2018-06-03 VITALS — RESPIRATION RATE: 18 BRPM

## 2018-06-03 RX ADMIN — Medication SCH TAB: at 09:33

## 2018-06-03 NOTE — PCM.PYCHPN
Psychiatric Progress Note





- Psychiatric Progress Note


Patient seen today, length of contact: 15  min


Patient Chief Complaint: 





I am still having withdrawal symptoms


Problems Identified/Issues Discussed: 





Patient seen and evaluated, chart reviewed and discussed with the nurse.


Patient reports withdrawal symptoms including abdominal cramps, joint pains, 

nausea, anxiety, and headaches. 


She reports irritability but denies any auditory or visual hallucinations, or 

any psychotic symptoms. 


She reports improvement in her sleep and appetite. 


She is tolerating the withdrawal medications and denies any side effects. 


Supportive therapy and psychoeducation were given.


Medication Change: Yes (detox changes daily)


Medical Record Reviewed: Yes





Mental Status Examination





- Cognitive Function


Orientation: Person, Place, Situation, Time


Memory: Intact


Attention: Poor


Concentration: Poor


Association: WNL


Fund of Knowledge: WNL





- Mood


Mood: Anxious, Other (irate)





- Affect


Affect: Broad





- Speech


Speech: Appropriate





- Formal Thought Process


Formal Thought Process: No Impairment





- Suicidal Ideation


Suicidal Ideation: No





- Homicidal Ideation


Homicidal Ideation: No





Goal/Treatment Plan





- Goal/Treatment Plan


Need for Continued Stay: Discharge may exacerbated symptoms, Severe functional 

impairment


Progress Toward Problem(s) and Goals/Treatment Plan: 





Methadone detox


Librium detox


Macrobid for UTI


As needed medications


Attend groups and activities


Supportive therapy and psychoeducation


MI for abstinence


CBT for relapse prevention


Encourage MAT


Smoking Cessation


Refer to rehab or IOP


Attend self-help groups as well

## 2018-06-04 VITALS
SYSTOLIC BLOOD PRESSURE: 138 MMHG | OXYGEN SATURATION: 97 % | HEART RATE: 72 BPM | TEMPERATURE: 98.6 F | DIASTOLIC BLOOD PRESSURE: 91 MMHG

## 2018-06-04 RX ADMIN — Medication SCH TAB: at 09:06

## 2018-06-04 NOTE — PCM.PYCHDC
Mental Status Examination





- Mental Status Examination


Orientation: Person, Place, Situation, Time


Memory: Intact


Mood: Anxious


Affect: Constricted


Speech: Slurred


Attention: WNL


Concentration: Poor


Association: WNL


Fund of Knowledge: WNL


Formal Thought Process: No Impairment


Suicidal Ideation: No


Current Homicidal Ideation?: No





Discharge Summary





- Discharge Note


Reason for Hospitalization: 





Opioid and benzo/alcohol detox


Consultations:: List each consultation separately and include:  1. Reason for 

request.  2. Findings.  3. Follow-up


Summary of Hospital Course include:: 1. Description of specific treatment plan 

utilized for patients during their course of treatmen.  2. Summarize the time-

course for resolution of acute symptoms and/or regressed behaviors.  3. 

Describe issues identified and worked on during hospitalization.  4. Describe 

medication utilized.  5. Describe medical problems identified and treated.  6. 

Reassessment of suicide risk


Summary of Hospital Course: 





She is seen today and chart reviewed and case discussed.





On admission:


This is a 62 year old female, , with 4 children (41 y/o, 33 y/o, 31 y/o, 

22 y/o), lives with her one daughter (with grandchild and son in law in the 

apartment), unemployed, with Medicaid, who presented to the ED requesting 

detox. Patient reports a >30 year history of using heroin. She admits to using 

a variety of substances including heroin (snorted), oxycodone, cocaine (snorted)

, crack cocaine, Xanax, EtOH (3 cans of beer per day), and tobacco (1 PPD for 

20 years). Denies the use of marijuana, MDMA< and PCP. 





Her last time here was in March. Patient planned on going to [facility] in 

[place] after discharge but decided right after discharge she could not do it. 

She relapsed the day after and started heroin again (10 bags a day). She 

expresses the desire to go a program right now. She expresses her concern of 

her inability to quit completely. Patient further explains that she has 

attempted to quit many times throughout her life, and that her longest periods 

of sobriety were when she was having her children. She recently lost her 

 of 36 years this past August. Since then, she has been bereaving and 

sought out using again. 





Patient does have withdrawal symptoms complaining of nausea, diarrhea, vomiting

, joint pain, and jitteriness. Denies chest pain, shortness of breath, fever, 

or chills. 





Detox Hx: 3x in the past; denies overdose


Rehab Hx: 1x at [facility] over 20 years ago, has done NA and AA meetings in 

the past


Medical Hx: denies


Surgical Hx: denies


Medications: denies


Psych Hx: has required weekly mental health counseling as part of her probation 

in the past


Fam Hx: her brother had an overdose in the past


Legal: No longer on probation


Allergies: Penicillin








Hospital course:


The pt was admitted and started on treatment with psychotherapy, support, 

psychoeducation and medications. 


MI and CBT used.


The pt attended groups and activities, as well as milieu therapy.


All the risks and benefits of medications are discussed and the patient 

understood and agreed.


The pt improved with the treatments provided. She was very med-seeking and 

sometimes verbally abusive to the nurses.


After care discussed with the patient. She will go to [facility] and also 

attend an IOP





- Final Diagnosis (DSM 5)


Condition upon Discharge: IMPROVED


DSM 5: 





Opioid Withdrawal, moderate


Opioid Use Disorder, moderate


Alcohol use d/o -severe


Alcohol withdrawal


Tobacco Use Disorder, moderate


Sedative hypnotic or anxiolytic use d/o


Personality d/o - unspecified


Disposition: HOME/ ROUTINE


Follow-up Treatment Plan: 


Methadone detox


Librium detox


Macrobid for UTI


As needed medications


Attend groups and activities


Supportive therapy and psychoeducation


MI for abstinence


CBT for relapse prevention


Encourage MAT


Smoking Cessation


Refer to rehab or IOP


Attend self-help groups as well





Prescriptions/Medication Reconciliation: 


hydrOXYzine HCl [Atarax] 25 mg PO BID PRN #60 tab


 PRN Reason: Anxiety


Nitrofurantoin Macrocrystals [Macrobid] 100 mg PO Q12H #4 cap


traZODone [Desyrel] 100 mg PO HS PRN #30 tab


 PRN Reason: Insomnia





- Smoking Cessation


Smoking Cessation Medication prescribed: No





- Antipsychotic Medications


Pt discharged on 2 or more routine antipsychotic medications: No

## 2018-09-25 ENCOUNTER — HOSPITAL ENCOUNTER (OUTPATIENT)
Dept: HOSPITAL 42 - ED | Age: 63
Setting detail: OBSERVATION
Discharge: LEFT BEFORE DISCHARGE | End: 2018-09-25
Attending: FAMILY MEDICINE | Admitting: FAMILY MEDICINE
Payer: MEDICAID

## 2018-09-25 VITALS — RESPIRATION RATE: 20 BRPM | DIASTOLIC BLOOD PRESSURE: 69 MMHG | TEMPERATURE: 98.4 F | SYSTOLIC BLOOD PRESSURE: 121 MMHG

## 2018-09-25 VITALS — HEART RATE: 63 BPM

## 2018-09-25 VITALS — BODY MASS INDEX: 24.5 KG/M2

## 2018-09-25 VITALS — OXYGEN SATURATION: 96 %

## 2018-09-25 DIAGNOSIS — F14.90: ICD-10-CM

## 2018-09-25 DIAGNOSIS — E87.6: ICD-10-CM

## 2018-09-25 DIAGNOSIS — R00.2: ICD-10-CM

## 2018-09-25 DIAGNOSIS — R06.02: ICD-10-CM

## 2018-09-25 DIAGNOSIS — R07.89: Primary | ICD-10-CM

## 2018-09-25 DIAGNOSIS — F17.210: ICD-10-CM

## 2018-09-25 DIAGNOSIS — F11.90: ICD-10-CM

## 2018-09-25 DIAGNOSIS — F10.20: ICD-10-CM

## 2018-09-25 LAB
ALBUMIN SERPL-MCNC: 3.5 G/DL (ref 3–4.8)
ALBUMIN/GLOB SERPL: 1.2 {RATIO} (ref 1.1–1.8)
ALT SERPL-CCNC: 50 U/L (ref 7–56)
APTT BLD: 29.4 SECONDS (ref 25.1–36.5)
AST SERPL-CCNC: 53 U/L (ref 14–36)
BNP SERPL-MCNC: 166 PG/ML (ref 0–450)
BUN SERPL-MCNC: 8 MG/DL (ref 7–21)
CALCIUM SERPL-MCNC: 8.7 MG/DL (ref 8.4–10.5)
ERYTHROCYTE [DISTWIDTH] IN BLOOD BY AUTOMATED COUNT: 13.5 % (ref 11.5–14.5)
GFR NON-AFRICAN AMERICAN: > 60
HDLC SERPL-MCNC: 55 MG/DL (ref 29–60)
HGB BLD-MCNC: 12.9 G/DL (ref 12–16)
INR PPP: 1.06
LDLC SERPL-MCNC: 44 MG/DL (ref 0–129)
MCH RBC QN AUTO: 33.6 PG (ref 25–35)
MCHC RBC AUTO-ENTMCNC: 35.1 G/DL (ref 31–37)
MCV RBC AUTO: 95.8 FL (ref 80–105)
PLATELET # BLD: 169 10^3/UL (ref 120–450)
PMV BLD AUTO: 10.1 FL (ref 7–11)
PROTHROMBIN TIME: 12.1 SECONDS (ref 9.4–12.5)
RBC # BLD AUTO: 3.84 10^6/UL (ref 3.5–6.1)
TROPONIN I SERPL-MCNC: < 0.01 NG/ML
WBC # BLD AUTO: 8.2 10^3/UL (ref 4.5–11)

## 2018-09-25 PROCEDURE — 93306 TTE W/DOPPLER COMPLETE: CPT

## 2018-09-25 PROCEDURE — 85730 THROMBOPLASTIN TIME PARTIAL: CPT

## 2018-09-25 PROCEDURE — 80320 DRUG SCREEN QUANTALCOHOLS: CPT

## 2018-09-25 PROCEDURE — 99285 EMERGENCY DEPT VISIT HI MDM: CPT

## 2018-09-25 PROCEDURE — 85027 COMPLETE CBC AUTOMATED: CPT

## 2018-09-25 PROCEDURE — 82550 ASSAY OF CK (CPK): CPT

## 2018-09-25 PROCEDURE — 84443 ASSAY THYROID STIM HORMONE: CPT

## 2018-09-25 PROCEDURE — 80053 COMPREHEN METABOLIC PANEL: CPT

## 2018-09-25 PROCEDURE — 80061 LIPID PANEL: CPT

## 2018-09-25 PROCEDURE — 84484 ASSAY OF TROPONIN QUANT: CPT

## 2018-09-25 PROCEDURE — 85610 PROTHROMBIN TIME: CPT

## 2018-09-25 PROCEDURE — 83880 ASSAY OF NATRIURETIC PEPTIDE: CPT

## 2018-09-25 PROCEDURE — 83615 LACTATE (LD) (LDH) ENZYME: CPT

## 2018-09-25 PROCEDURE — 71045 X-RAY EXAM CHEST 1 VIEW: CPT

## 2018-09-25 PROCEDURE — 93005 ELECTROCARDIOGRAM TRACING: CPT

## 2018-09-25 PROCEDURE — 83036 HEMOGLOBIN GLYCOSYLATED A1C: CPT

## 2018-09-25 NOTE — RAD
HISTORY:

 sob 



COMPARISON:

Chest x-ray performed 12/25/17 



TECHNIQUE:

Chest, one view.



FINDINGS:





LUNGS:

No focal consolidation.



Please note that chest x-ray has limited sensitivity for the 

detection of pulmonary masses.



PLEURA:

No significant pleural effusion identified. No definite pneumothorax .



CARDIOVASCULAR:

Heart size appears within normal limits.  Atherosclerotic 

calcification of the aortic knob.



OSSEOUS STRUCTURES:

Degenerative changes. 



VISUALIZED UPPER ABDOMEN:

Unremarkable.



OTHER FINDINGS:

None.



IMPRESSION:

No focal consolidation, significant pleural effusion, or definite 

pneumothorax identified.

## 2018-09-25 NOTE — CARD
--------------- APPROVED REPORT --------------





Date of service: 09/25/2018



EKG Measurement

Heart [id number]

VT [id number]

[id number]

[id number]

[id number]



<Conclusion>

Normal sinus rhythm

Left axis deviation

RVCD

No change

## 2018-09-25 NOTE — CP.PCM.HP
<Yair Mejia - Last Filed: 18 06:47>





History of Present Illness





- History of Present Illness


History of Present Illness: 





Yair Mejia PGY1 History and Physical for Dr Maggi Rosenbaum





Pt is a 64 yo female with PMH of cocaine use, heroin use and alcoholism who 

presents to the ED complaining of constant 6/10 crushing left sided chest pain  

and associated "heart racing" which began at rest at 9pm yesterday (18) 

after she finished power walking through the park one hour earlier. Pt states 

that she was at a party the night before where she used cocaine. Pt states she 

tried to lay down to help relieve the chest pain, but it did not help. Pt 

reports diaphoresis during this episode. The pain was getting progressively 

worse while she was at home. Nothing like this has ever happened before. Pt 

denies nausea, vomiting, constipation, diarrhea. Pt denies radiating into the 

jaw, or either arm. The pain is not pleuritic. A 12 point ROS was obtained and 

added to HPI. 








PMH: cocaine use, heroin use and alcoholism


PSH:  


FH: mother 75, unsure of her medical history, father 80, "heart problems"


SH: Tobacco 1ppd for 40 years, alcohol use for 20 years, cocaine use, heroin 

use, denies IVDA, lives in [place], does not work 


Home meds: none


Allergies: penicillin


PMD: denies





Present on Admission





- Present on Admission


Any Indicators Present on Admission: No





Review of Systems





- Review of Systems


Review of Systems: 





a 12 point ROS was obtained and added to the HPI where appropriate





Past Patient History





- Infectious Disease


Hx of Infectious Diseases: None





- Tetanus Immunizations


Tetanus Immunization: Unknown





- Past Medical History & Family History


Past Medical History?: No





- Past Social History


Smoking Status: Heavy Smoker > 10 Cigarettes Daily





- CARDIAC


Hx Cardiac Disorders: No





- PULMONARY


Hx Respiratory Disorders: No





- NEUROLOGICAL


Hx Neurological Disorder: No





- HEENT


Hx HEENT Problems: No





- RENAL


Hx Chronic Kidney Disease: No





- ENDOCRINE/METABOLIC


Hx Endocrine Disorders: No





- HEMATOLOGICAL/ONCOLOGICAL


Hx Blood Disorders: No





- INTEGUMENTARY


Hx Dermatological Problems: No





- MUSCULOSKELETAL/RHEUMATOLOGICAL


Hx Musculoskeletal Disorders: Yes


Hx Back Pain: Yes


Other/Comment: Sciatica





- GASTROINTESTINAL


Hx Gastrointestinal Disorders: No





- GENITOURINARY/GYNECOLOGICAL


Hx Genitourinary Disorders: No





- PSYCHIATRIC


Hx Psychophysiologic Disorder: Yes


Hx Substance Use: Yes


Other/Comment: substance abuse





- SURGICAL HISTORY


Hx Surgeries: No





- ANESTHESIA


Hx Anesthesia: No





Meds


Allergies/Adverse Reactions: 


                                    Allergies











Allergy/AdvReac Type Severity Reaction Status Date / Time


 


Penicillins Allergy  ANAPHYLAXIS Verified 18 13:20














Physical Exam





- Head Exam


Head Exam: ATRAUMATIC, NORMOCEPHALIC





- Eye Exam


Eye Exam: EOMI





- ENT Exam


ENT Exam: Mucous Membranes Moist





- Respiratory Exam


Respiratory Exam: Clear to Auscultation Bilateral, NORMAL BREATHING PATTERN.  

absent: Accessory Muscle Use, Wheezes, Respiratory Distress





- Cardiovascular Exam


Cardiovascular Exam: RRR, +S1, +S2.  absent: JVD





- GI/Abdominal Exam


GI & Abdominal Exam: Normal Bowel Sounds, Soft





- Back Exam


Back exam: absent: CVA tenderness (L), CVA tenderness (R)





- Neurological Exam


Neurological exam: Alert, CN II-XII Intact, Oriented x3





- Psychiatric Exam


Psychiatric exam: Normal Affect, Normal Mood





- Skin


Skin Exam: Dry, Normal Color, Warm





Results





- Vital Signs


Recent Vital Signs: 





                                Last Vital Signs











Temp  97.8 F   18 02:49


 


Pulse  67   18 02:49


 


Resp  18   18 02:49


 


BP  147/86   18 02:49


 


Pulse Ox  96   18 02:49














- Labs


Result Diagrams: 


                                 18 01:13





                                 18 01:13


Labs: 





                         Laboratory Results - last 24 hr











  18





  01:13 01:13 01:13


 


WBC    8.2  D


 


RBC    3.84


 


Hgb    12.9  D


 


Hct    36.8


 


MCV    95.8


 


MCH    33.6


 


MCHC    35.1


 


RDW    13.5


 


Plt Count    169


 


MPV    10.1


 


PT  12.1  


 


INR  1.06  


 


APTT  29.4  


 


Sodium   137 


 


Potassium   3.4 L 


 


Chloride   102 


 


Carbon Dioxide   30 


 


Anion Gap   9 L 


 


BUN   8 


 


Creatinine   0.7 


 


Est GFR ( Amer)   > 60 


 


Est GFR (Non-Af Amer)   > 60 


 


Random Glucose   125 H 


 


Calcium   8.7 


 


Total Bilirubin   0.6 


 


AST   53 H 


 


ALT   50 


 


Alkaline Phosphatase   76 


 


Lactate Dehydrogenase   552 


 


Total Creatine Kinase   124 


 


Troponin I   < 0.01 


 


NT-Pro-B Natriuret Pep   166 


 


Total Protein   6.5 


 


Albumin   3.5 


 


Globulin   3.0 


 


Albumin/Globulin Ratio   1.2 














Assessment & Plan





- Assessment and Plan (Free Text)


Assessment: 





Pt is a 64 yo female with PMH of cocaine use, heroin use and alcoholism who 

presents to the ED complaining of constant 6/10 crushing left sided chest pain  

and associated "heart racing" which began at rest at 9pm yesterday (18).


Plan: 





Unstable Angina


- rule out ACS 


- vasospasm likely due to cocaine use


- ASA 325mg


- EKG shows no ST or T wave abnormalities 


- Troponins negative x1, continue to trend


- follow up HA1C


- follow up TSH


- ordered nitro 0.4 prn


- ordered ECHO


- follow up lipid panel 


- UDS


- cardio consulted, [facility]





Cocaine Use


- counseled on the dangers of cocaine, encouraged to quit





Tobacco Use


- counseled on the dangers of tobacco, encouraged to quit





Hypokalemia


- K 3.4


- replete PRN





Pt seen, examined, assessment plan discussed with Dr Maggi Mejia PGY1





- Date & Time


Date: 18


Time: 02:30





<Maggi Rosenbaum N - Last Filed: 18 04:48>





Results





- Vital Signs


Recent Vital Signs: 





                                Last Vital Signs











Temp  97.8 F   18 02:49


 


Pulse  67   18 02:49


 


Resp  18   18 02:49


 


BP  147/86   18 02:49


 


Pulse Ox  96   18 02:49














- Labs


Result Diagrams: 


                                 18 01:13





                                 18 01:13


Labs: 





                         Laboratory Results - last 24 hr











  18





  01:13 01:13 01:13


 


WBC    8.2  D


 


RBC    3.84


 


Hgb    12.9  D


 


Hct    36.8


 


MCV    95.8


 


MCH    33.6


 


MCHC    35.1


 


RDW    13.5


 


Plt Count    169


 


MPV    10.1


 


PT  12.1  


 


INR  1.06  


 


APTT  29.4  


 


Sodium   137 


 


Potassium   3.4 L 


 


Chloride   102 


 


Carbon Dioxide   30 


 


Anion Gap   9 L 


 


BUN   8 


 


Creatinine   0.7 


 


Est GFR ( Amer)   > 60 


 


Est GFR (Non-Af Amer)   > 60 


 


Random Glucose   125 H 


 


Calcium   8.7 


 


Total Bilirubin   0.6 


 


AST   53 H 


 


ALT   50 


 


Alkaline Phosphatase   76 


 


Lactate Dehydrogenase   552 


 


Total Creatine Kinase   124 


 


Troponin I   < 0.01 


 


NT-Pro-B Natriuret Pep   166 


 


Total Protein   6.5 


 


Albumin   3.5 


 


Globulin   3.0 


 


Albumin/Globulin Ratio   1.2

## 2018-09-25 NOTE — CARD
--------------- APPROVED REPORT --------------





Date of service: 09/25/2018



EXAM: Two-dimensional and M-mode echocardiogram with Doppler and 

color Doppler.



INDICATION

Chest Pain 



2D DIMENSIONS 

Left Atrium (2D)3.2   (1.6-4.0cm)IVSd1.0   (0.7-1.1cm)

LVDd4.0   (3.9-5.9cm)PWd1.1   (0.7-1.1cm)

LVDs2.6   (2.5-4.0cm)FS (%) 33.4   %

LVEF (%)62.7   (>50%)



M-Mode DIMENSIONS 

Aortic Root2.60   (2.2-3.7cm)Aortic Cusp Exc.1.70   (1.5-2.0cm)



Aortic Valve

AoV Peak Dewujezj326.0cm/Aranza Peak GR.7mmHg



Mitral Valve

MV E Knoydtps78.4cm/sMV A Djqsgswu34.3cm/sE/A ratio0.9



TDI

E/Lateral E'0.0E/Medial E'0.0



Tricuspid Valve

TR Peak Cdznmbaf642tt/sRAP [id number] Peak Gr.19mmHg

UCEY19oxTs



 LEFT VENTRICLE 

The left ventricle is normal size. There is normal left ventricular 

wall thickness. The left ventricular function is normal. The left 

ventricular ejection fraction is within the normal range. There is 

normal LV segmental wall motion.



 RIGHT VENTRICLE 

The right ventricle is normal size. The right ventricular systolic 

function is normal.



 ATRIA 

The left atrium size is normal. The right atrium size is normal. The 

interatrial septum is intact with no evidence for an atrial septal 

defect.



 AORTIC VALVE 

The aortic valve is normal in structure. No aortic regurgitation is 

present. There is no aortic valvular stenosis. 



 MITRAL VALVE 

The mitral valve is normal in structure. There is no mitral valve 

regurgitation noted.



 TRICUSPID VALVE 

The tricuspid valve is normal in structure. There is mild tricuspid 

regurgitation.



 PULMONIC VALVE 

The pulmonary valve is normal in structure.



 GREAT VESSELS 

The aortic root is normal in size. The IVC is normal in size and 

collapses >50% with inspiration.



 PERICARDIAL EFFUSION 

There is no pleural effusion. There is no pericardial effusion.



<Conclusion>

Suboptimal imaging.

Grossly normal chamber size.

Normal LV systolic function.

No obvious valvular abnromalities seen.

## 2018-09-25 NOTE — ED PDOC
Arrival/HPI





- General


Chief Complaint: Chest Pain


Time Seen by Provider: 09/25/18 00:14


Historian: Patient





- History of Present Illness


Narrative History of Present Illness (Text): 


09/25/18 00:31


Jozef Ahumada is a 63 year old female smoker, whose past medical history includes

polysubstance abuse and alcohol abuse, who presents to the ED complaining of 

chest tightness. Patient state she began experiencing chest tightness today with

associated palpitations and shortness of breath. Patient has been using her 

inhaler with no significant relief. Patient denies any fever, chills, abdominal 

pain, nausea, vomiting, diarrhea, headache, or any other complaints.





Symptom Onset: Gradual


Symptom Course: Unchanged


Activities at Onset: Light


Context: Home





Past Medical History





- Provider Review


Nursing Documentation Reviewed: Yes





- Infectious Disease


Hx of Infectious Diseases: None





- Tetanus Immunization


Tetanus Immunization: Unknown





- Past Medical History


Past Medical History: Unable to Obtain





- Cardiac


Hx Cardiac Disorders: No





- Pulmonary


Hx Respiratory Disorders: No





- Neurological


Hx Neurological Disorder: No





- HEENT


Hx HEENT Disorder: No





- Renal


Hx Renal Disorder: No





- Endocrine/Metabolic


Hx Endocrine Disorders: No





- Hematological/Oncological


Hx Blood Disorders: No





- Integumentary


Hx Dermatological Disorder: No





- Musculoskeletal/Rheumatological


Hx Musculoskeletal Disorders: Yes


Hx Back Pain: Yes


Other/Comment: Sciatica





- Gastrointestinal


Hx Gastrointestinal Disorders: No





- Genitourinary/Gynecological


Hx Genitourinary Disorders: No





- Psychiatric


Hx Psychophysiologic Disorder: Yes


Hx Substance Use: Yes


Other/Comment: substance abuse





- Past Surgical History


Past Surgical History: Unable to Obtain





- Anesthesia


Hx Anesthesia: No





- Suicidal Assessment


Feels Threatened In Home Enviroment: No





Family/Social History





- Physician Review


Nursing Documentation Reviewed: Yes


Family/Social History: Unknown Family HX


Smoking Status: Heavy Smoker > 10 Cigarettes Daily


Hx Alcohol Use: Yes


Frequency of alcohol use: Socially


Hx Substance Use: Yes


Substance used: heroin





Allergies/Home Meds


Allergies/Adverse Reactions: 


Allergies





Penicillins Allergy (Verified 03/04/18 13:20)


   ANAPHYLAXIS











Review of Systems





- Physician Review


All systems were reviewed & negative as marked: Yes





- Review of Systems


Constitutional: Normal.  absent: Fevers


Eyes: Normal


ENT: Normal


Respiratory: SOB


Cardiovascular: Chest Pain, Palpitations


Gastrointestinal: Normal.  absent: Abdominal Pain, Diarrhea, Nausea, Vomiting


Genitourinary Female: Normal.  absent: Dysuria, Frequency, Hematuria, Urine 

Output Changes


Musculoskeletal: Normal.  absent: Back Pain, Neck Pain


Skin: Normal.  absent: Rash


Neurological: Normal.  absent: Headache, Dizziness


Endocrine: Normal


Hemo/Lymphatic: Normal


Psychiatric: Normal





Physical Exam


Vital Signs Reviewed: Yes


Vital Signs











  Temp Pulse Resp BP Pulse Ox


 


 09/25/18 02:20  97.8 F  67  18  147/86  96


 


 09/25/18 00:20  97.8 F  70  18  119/76  97











Temperature: Afebrile


Blood Pressure: Normal


Pulse: Regular


Respiratory Rate: Normal


Appearance: Positive for: Well-Appearing, Non-Toxic, Comfortable


Pain Distress: None


Mental Status: Positive for: Alert and Oriented X 3





- Systems Exam


Head: Present: Atraumatic, Normocephalic


Pupils: Present: PERRL


Extroacular Muscles: Present: EOMI


Conjunctiva: Present: Normal


Mouth: Present: Moist Mucous Membranes


Neck: Present: Normal Range of Motion


Respiratory/Chest: Present: Decreased Breath Sounds.  No: Respiratory Distress, 

Accessory Muscle Use


Cardiovascular: Present: Regular Rate and Rhythm, Normal S1, S2.  No: Murmurs


Abdomen: No: Tenderness, Distention, Peritoneal Signs


Back: Present: Normal Inspection


Upper Extremity: Present: Normal Inspection.  No: Cyanosis, Edema


Lower Extremity: Present: Normal Inspection.  No: Edema


Neurological: Present: GCS=15, CN II-XII Intact, Speech Normal


Skin: Present: Warm, Dry, Normal Color.  No: Rashes


Psychiatric: Present: Alert, Oriented x 3, Normal Insight, Normal Concentration





Medical Decision Making


ED Course and Treatment: 


09/25/18 00:31


Impression:


63 year old female c/o chest tightness, palpitations, and shortness of breath.





Plan:


-- EKG


-- CXR


-- Labs, cardiac enzymes, BNP


-- Duoneb


-- Reassess and disposition





Progress Notes:


Reviewed EKG, NSR at 77bpm.  LAD. Non-specific ST/T changes.





09/25/18 01:22


CXR reviewed, shows no acute processes.





09/25/18 02:14


Case discussed with medical resident on call, who is aware and agrees with plan.





09/25/18 02:22


Case discussed with Dr. PAULO Rosenbaum, who is aware and agrees with plan. Accepts pt 

in to hospitalist service. Pt will go to remote telemetry observation for chest 

pain.





- Lab Interpretations


Lab Results: 








                                 09/25/18 01:13 





                                 09/25/18 01:13 





                                   Lab Results





09/25/18 01:13: WBC 8.2  D, RBC 3.84, Hgb 12.9  D, Hct 36.8, MCV 95.8, MCH 33.6,

MCHC 35.1, RDW 13.5, Plt Count 169, MPV 10.1


09/25/18 01:13: Sodium 137, Potassium 3.4 L, Chloride 102, Carbon Dioxide 30, 

Anion Gap 9 L, BUN 8, Creatinine 0.7, Est GFR (African Amer) > 60, Est GFR (Non-

Af Amer) > 60, Random Glucose 125 H, Calcium 8.7, Total Bilirubin 0.6, AST 53 H,

ALT 50, Alkaline Phosphatase 76, Lactate Dehydrogenase 552, Total Creatine 

Kinase 124, Troponin I < 0.01, NT-Pro-B Natriuret Pep 166, Total Protein 6.5, 

Albumin 3.5, Globulin 3.0, Albumin/Globulin Ratio 1.2


09/25/18 01:13: PT 12.1, INR 1.06, APTT 29.4











- RAD Interpretation


Radiology Orders: 








09/25/18 00:31


CHEST PORTABLE [RAD] Stat 














- EKG Interpretation


Interpreted by ED Physician: Yes


Type: 12 lead EKG





- Medication Orders


Current Medication Orders: 











Discontinued Medications





Albuterol/Ipratropium (Duoneb 3 Mg/0.5 Mg (3 Ml) Ud)  3 ml IH ONCE STA


   Stop: 09/25/18 00:33


   Last Admin: 09/25/18 01:14  Dose: 3 ml





Aspirin (Aspirin)  325 mg PO ONCE STA


   Stop: 09/25/18 02:10


Potassium Chloride (K-Dur 20 Meq Er Tab)  20 meq PO STAT STA


   Stop: 09/25/18 02:09











- Scribe Statement


The provider has reviewed the documentation as recorded by the Scribana Parra





All medical record entries made by the Elaineibana were at my direction and 

personally dictated by me. I have reviewed the chart and agree that the record 

accurately reflects my personal performance of the history, physical exam, 

medical decision making, and the department course for this patient. I have also

 personally directed, reviewed, and agree with the discharge instructions and 

disposition.





Disposition/Present on Arrival





- Present on Arrival


Any Indicators Present on Arrival: No


History of DVT/PE: No


History of Uncontrolled Diabetes: No


Urinary Catheter: No


History of Decub. Ulcer: No


History Surgical Site Infection Following: None





- Disposition


Have Diagnosis and Disposition been Completed?: Yes


Diagnosis: 


 Chest pain





Disposition: HOSPITALIZED


Disposition Time: 02:12


Patient Plan: Observation


Patient Problems: 


                             Current Active Problems











Problem Status Onset


 


Chest pain Acute                        











Condition: STABLE